# Patient Record
Sex: MALE | Race: WHITE | NOT HISPANIC OR LATINO | Employment: FULL TIME | URBAN - METROPOLITAN AREA
[De-identification: names, ages, dates, MRNs, and addresses within clinical notes are randomized per-mention and may not be internally consistent; named-entity substitution may affect disease eponyms.]

---

## 2017-02-08 ENCOUNTER — ALLSCRIPTS OFFICE VISIT (OUTPATIENT)
Dept: OTHER | Facility: OTHER | Age: 37
End: 2017-02-08

## 2017-02-08 LAB
BILIRUB UR QL STRIP: NORMAL
CLARITY UR: NORMAL
COLOR UR: YELLOW
GLUCOSE (HISTORICAL): NORMAL
HGB UR QL STRIP.AUTO: NORMAL
KETONES UR STRIP-MCNC: NORMAL MG/DL
LEUKOCYTE ESTERASE UR QL STRIP: NORMAL
NITRITE UR QL STRIP: NORMAL
PH UR STRIP.AUTO: 7.5 [PH]
PROT UR STRIP-MCNC: NORMAL MG/DL
SP GR UR STRIP.AUTO: 1.01
UROBILINOGEN UR QL STRIP.AUTO: NORMAL

## 2017-03-09 ENCOUNTER — ALLSCRIPTS OFFICE VISIT (OUTPATIENT)
Dept: OTHER | Facility: OTHER | Age: 37
End: 2017-03-09

## 2017-03-09 DIAGNOSIS — Z30.2 ENCOUNTER FOR STERILIZATION: ICD-10-CM

## 2017-03-09 PROCEDURE — 88302 TISSUE EXAM BY PATHOLOGIST: CPT | Performed by: UROLOGY

## 2017-03-10 ENCOUNTER — LAB REQUISITION (OUTPATIENT)
Dept: LAB | Facility: HOSPITAL | Age: 37
End: 2017-03-10
Payer: COMMERCIAL

## 2017-03-10 DIAGNOSIS — Z30.2 ENCOUNTER FOR STERILIZATION: ICD-10-CM

## 2017-03-24 ENCOUNTER — ALLSCRIPTS OFFICE VISIT (OUTPATIENT)
Dept: OTHER | Facility: OTHER | Age: 37
End: 2017-03-24

## 2017-04-27 DIAGNOSIS — Z30.09 ENCOUNTER FOR OTHER GENERAL COUNSELING AND ADVICE ON CONTRACEPTION: ICD-10-CM

## 2017-05-08 ENCOUNTER — APPOINTMENT (OUTPATIENT)
Dept: LAB | Facility: HOSPITAL | Age: 37
End: 2017-05-08
Payer: COMMERCIAL

## 2017-05-08 ENCOUNTER — TRANSCRIBE ORDERS (OUTPATIENT)
Dept: LAB | Facility: HOSPITAL | Age: 37
End: 2017-05-08

## 2017-05-08 DIAGNOSIS — Z30.09 ENCOUNTER FOR OTHER GENERAL COUNSELING AND ADVICE ON CONTRACEPTION: ICD-10-CM

## 2017-05-08 LAB
COMMENT POST VAS: NORMAL
PH SMN: 8.1 [PH] (ref 7.2–8.6)
POST  VAS COLLECTION: NORMAL
SPECIMEN VOL SMN: 2.3 ML (ref 1–5)
VISC SMN: 3 CP (ref 3–4)
WBC SMN QL: 0 "HPF"

## 2017-05-08 PROCEDURE — 89321 SEMEN ANAL SPERM DETECTION: CPT

## 2017-05-22 ENCOUNTER — APPOINTMENT (OUTPATIENT)
Dept: LAB | Facility: HOSPITAL | Age: 37
End: 2017-05-22
Payer: COMMERCIAL

## 2017-05-22 DIAGNOSIS — Z30.09 ENCOUNTER FOR OTHER GENERAL COUNSELING AND ADVICE ON CONTRACEPTION: ICD-10-CM

## 2017-05-22 LAB
COMMENT POST VAS: ABNORMAL
PH SMN: 8.3 [PH] (ref 7.2–8.6)
POST  VAS COLLECTION: ABNORMAL
SPECIMEN VOL SMN: 1.6 ML (ref 1–5)
VISC SMN: 3 CP (ref 3–4)
WBC SMN QL: 1 "HPF"

## 2017-05-22 PROCEDURE — 89321 SEMEN ANAL SPERM DETECTION: CPT

## 2017-06-13 ENCOUNTER — APPOINTMENT (OUTPATIENT)
Dept: LAB | Age: 37
End: 2017-06-13
Attending: PHYSICIAN ASSISTANT
Payer: COMMERCIAL

## 2017-06-13 ENCOUNTER — TRANSCRIBE ORDERS (OUTPATIENT)
Dept: URGENT CARE | Age: 37
End: 2017-06-13

## 2017-06-13 ENCOUNTER — OFFICE VISIT (OUTPATIENT)
Dept: URGENT CARE | Age: 37
End: 2017-06-13
Payer: COMMERCIAL

## 2017-06-13 DIAGNOSIS — R42 DIZZINESS: ICD-10-CM

## 2017-06-13 DIAGNOSIS — R42 DIZZINESS: Primary | ICD-10-CM

## 2017-06-13 LAB
ATRIAL RATE: 72 BPM
GLUCOSE SERPL-MCNC: 99 MG/DL (ref 65–140)
P AXIS: 44 DEGREES
PR INTERVAL: 160 MS
QRS AXIS: 4 DEGREES
QRSD INTERVAL: 88 MS
QT INTERVAL: 358 MS
QTC INTERVAL: 392 MS
T WAVE AXIS: 34 DEGREES
VENTRICULAR RATE: 72 BPM

## 2017-06-13 PROCEDURE — 93005 ELECTROCARDIOGRAM TRACING: CPT | Performed by: FAMILY MEDICINE

## 2017-06-13 PROCEDURE — 82948 REAGENT STRIP/BLOOD GLUCOSE: CPT

## 2017-06-13 PROCEDURE — S9083 URGENT CARE CENTER GLOBAL: HCPCS | Performed by: FAMILY MEDICINE

## 2017-06-13 PROCEDURE — 93005 ELECTROCARDIOGRAM TRACING: CPT

## 2017-06-13 PROCEDURE — G0383 LEV 4 HOSP TYPE B ED VISIT: HCPCS | Performed by: FAMILY MEDICINE

## 2017-08-04 ENCOUNTER — TRANSCRIBE ORDERS (OUTPATIENT)
Dept: URGENT CARE | Age: 37
End: 2017-08-04

## 2017-08-04 ENCOUNTER — OFFICE VISIT (OUTPATIENT)
Dept: URGENT CARE | Age: 37
End: 2017-08-04
Payer: COMMERCIAL

## 2017-08-04 ENCOUNTER — APPOINTMENT (OUTPATIENT)
Dept: RADIOLOGY | Age: 37
End: 2017-08-04
Attending: PHYSICIAN ASSISTANT
Payer: COMMERCIAL

## 2017-08-04 DIAGNOSIS — S99.919A INJURY OF ANKLE: ICD-10-CM

## 2017-08-04 PROCEDURE — 73610 X-RAY EXAM OF ANKLE: CPT

## 2017-08-04 PROCEDURE — 99213 OFFICE O/P EST LOW 20 MIN: CPT | Performed by: FAMILY MEDICINE

## 2018-01-11 ENCOUNTER — GENERIC CONVERSION - ENCOUNTER (OUTPATIENT)
Dept: BEHAVIORAL HEALTH UNIT | Facility: HOSPITAL | Age: 38
End: 2018-01-11

## 2018-01-11 ENCOUNTER — OFFICE VISIT (OUTPATIENT)
Dept: URGENT CARE | Age: 38
End: 2018-01-11
Payer: COMMERCIAL

## 2018-01-11 PROCEDURE — G0382 LEV 3 HOSP TYPE B ED VISIT: HCPCS | Performed by: FAMILY MEDICINE

## 2018-01-11 PROCEDURE — S9083 URGENT CARE CENTER GLOBAL: HCPCS | Performed by: FAMILY MEDICINE

## 2018-01-13 NOTE — PROGRESS NOTES
Assessment   1  Acute bronchitis (466 0) (J20 9)    Plan   Acute bronchitis    · Start: Azithromycin 250 MG Oral Tablet (Zithromax Z-Bhavin); TAKE 2 TABLETS ON DAY 1    THEN TAKE 1 TABLET A DAY FOR 4 DAYS   · Start: PredniSONE 10 MG Oral Tablet; TAKE 6 TABLETS TODAY, THEN DECREASE BY 1    TABLET EACH DAY UNTIL GONE   · Drink plenty of fluids ; Status:Complete;   Done: 83UPR3890   · Resume activity to your tolerance ; Status:Complete;   Done: 96GJW8209   · Use a cool mist humidifier in the room ; Status:Complete;   Done: 00NUM7846   · We suggest that you try a probiotic supplement ; Status:Complete;   Done: 93AYZ5717    Discussion/Summary   Discussion Summary:    Continue over-the-counter medications as needed for symptomatic care  Medication Side Effects Reviewed: Possible side effects of new medications were reviewed with the patient/guardian today  Understands and agrees with treatment plan: The treatment plan was reviewed with the patient/guardian  The patient/guardian understands and agrees with the treatment plan    Counseling Documentation With Imm: The patient was counseled regarding instructions for management,-- prognosis,-- patient and family education,-- impressions,-- risks and benefits of treatment options,-- importance of compliance with treatment  Follow Up Instructions: Follow Up with your Primary Care Provider in 3-4 days  If your symptoms worsen, go to the nearest [place] [address] Emergency Department  Chief Complaint   1  Cough  2  Ear Pain  3  Wheezing  Chief Complaint Free Text Note Form: A persistent cough, chest congestion, post nasal drip, right ear pain since 12/25/17  wheezing and asthma flare - up started Sunday  Patient stated he was seen at the PCP on 12/28/17 and they stated it was allergies  History of Present Illness   HPI: Patient has had a persistent cough and chest congestion and postnasal drip since 12/25/2017   The patient has been doing the over-the-counter medications as directed by his PCP but symptoms are progressing  On Sunday he started having more wheezing and chest congestion  Hospital Based Practices Required Assessment:      Pain Assessment      the patient states they have pain  (on a scale of 0 to 10, the patient rates the pain at 0 )      Abuse And Domestic Violence Screen       Yes, the patient is safe at home  -- The patient states no one is hurting them  Depression And Suicide Screen  No, the patient has not had thoughts of hurting themself  No, the patient has not felt depressed in the past 7 days  Prefered Language is  english  Cough: Chris Contreras presents with complaints of cough  Associated symptoms include wheezing,-- runny nose,-- stuffy nose-- and-- postnasal drainage, but-- no dyspnea,-- no chills,-- no fever,-- no sore throat,-- no myalgias,-- no pleuritic chest pain,-- no chest pain,-- no vomiting-- and-- no heartburn  Review of Systems   Focused-Male:      Constitutional: as noted in HPI       ENT: as noted in HPI  Respiratory: as noted in HPI  Active Problems   1  Ankle injury (959 7) (S99 919A)  2  Encounter for sterilization (V25 2) (Z30 2)  3  Encounter for vasectomy counseling (V25 09) (Z30 09)  4  Insect sting (989 5,E905 5) (T63 481A)  5  Left ankle pain (719 47) (M25 572)  6  Left sided chest pain (786 50) (R07 9)  7  Lightheadedness (780 4) (R42)  8  Otitis media of right ear (382 9) (H66 91)  9  Sinusitis (473 9) (J32 9)  10  Strain of shoulder, right (840 9) (S46 911A)  11  Testicular pain, right (608 9) (N50 811)  12  Upper back pain on left side (724 5) (M54 9)  13  Viral infection (079 99) (B34 9)    Past Medical History   1  History of Asthma (493 90) (J45 909)  2  Diarrhea (787 91) (R19 7)  Active Problems And Past Medical History Reviewed: The active problems and past medical history were reviewed and updated today  Family History   Mother   1   Family history of cardiac disorder (V17 49) (Z82 49)  Family History Reviewed: The family history was reviewed and updated today  Social History    · Being A Social Drinker   · Former smoker (T08 10) (N62 201)  Social History Reviewed: The social history was reviewed and updated today  Surgical History   1  Denied: History Of Prior Surgery  Surgical History Reviewed: The surgical history was reviewed and updated today  Current Meds   1  ProAir  (90 Base) MCG/ACT Inhalation Aerosol Solution; Therapy: (Recorded:11Jan2018) to Recorded  2  RUSTTE Allergy CAPS; Therapy: (Recorded:11Jan2018) to Recorded  Medication List Reviewed: The medication list was reviewed and updated today  Allergies   1  Penicillin V SOLR    Vitals   Signs   Recorded: 45SCR9290 06:02PM   Temperature: 97 6 F, Temporal  Heart Rate: 90  Respiration: 20  Systolic: 583  Diastolic: 59  Height: 5 ft 11 in  Weight: 285 lb   BMI Calculated: 39 75  BSA Calculated: 2 45  O2 Saturation: 96  Pain Scale: 0    Physical Exam        Constitutional      General appearance: No acute distress, well appearing and well nourished  Eyes      Conjunctiva and lids: No swelling, erythema, or discharge  Pupils and irises: Equal, round and reactive to light  Ears, Nose, Mouth, and Throat      External inspection of ears and nose: Normal        Otoscopic examination: Tympanic membrance translucent with normal light reflex  Canals patent without erythema  -- Bilateral nasal congestion with mild erythema  No discharge  Oropharynx: Normal with no erythema, edema, exudate or lesions  Pulmonary      Respiratory effort: No increased work of breathing or signs of respiratory distress  -- Coarse breath sounds bilateral upper airways clears with cough  No rales  No wheezes        Lymphatic      Palpation of lymph nodes in neck: Abnormal   bilateral anterior cervical node enlargement, but-- no posterior cervical node enlargement,-- no submandibular node enlargement-- and-- no supraclavicular node enlargement  Psychiatric      Orientation to person, place and time: Normal        Mood and affect: Normal        Message   Return to work or school:    Huma Esparza is under my professional care   He was seen in my office on 01/11/2018   Please excuse work 01/12/2018 due to illness            Signatures    Electronically signed by : Yadira Damon, [facility]; Jan 11 2018  6:56PM EST                       (Author)     Electronically signed by : Eleazar Lovett DO; Jan 12 2018  7:02AM EST                       (Co-author)

## 2018-01-13 NOTE — PROCEDURES
Assessment    1  Encounter for sterilization (V25 2) (Z30 2)    Plan  Encounter for sterilization    · Oxycodone-Acetaminophen 5-325 MG Oral Tablet; TAKE 1 TO 2 TABLETS EVERY 4  HOURS AS NEEDED FOR PAIN   Rx By: Yusuf Mcgregor; Dispense: 3 Days ; #:20 Tablet; Refill: 0; For: Encounter for sterilization; SUNI = N; Print Rx   · (1) TISSUE EXAM; Status:Active - Retrospective By Protocol Authorization; Requested  ZXI:[id number];    Perform:[facility]; KZI:[id number]; Last Updated By:Yamini Terrell; 3/9/2017 9:12:41 AM;Ordered;  For:Encounter for sterilization; Ordered By:Kristen Denis;  B : right vas deferns  B Date/Time: : 14FAX8280  B : Vas Deferens  A : left vas deferens  A Date/Time: : 90JBV8912  A : Vas Deferens  Impression: : z30 2   · Follow-up visit in 2 weeks Evaluation and Treatment  Follow-up  Status: Hold For -  Scheduling  Requested for: 59UNQ0036   Ordered; For: Encounter for sterilization; Ordered By: Yusuf Mcgregor Performed:  Due: 50MOZ1316    Chief Complaint  Chief Complaint Free Text Note Form: vas- signed consent in chart      History of Present Illness  HPI: 80-year-old male presents for vasectomy  He has no new complaints  Active Problems    1  Ankle injury (959 7) (S99 919A)   2  Encounter for sterilization (V25 2) (Z30 2)   3  Encounter for vasectomy counseling (V25 09) (Z30 09)   4  Left ankle pain (719 47) (M25 572)   5  Left sided chest pain (786 50) (R07 9)   6  Otitis media of right ear (382 9) (H66 91)   7  Strain of shoulder, right (840 9) (S46 911A)   8  Testicular pain, right (608 9) (N50 811)   9  Upper back pain on left side (724 5) (M54 9)   10  Viral infection (079 99) (B34 9)    Past Medical History    1  History of Asthma (493 90) (J45 909)   2  Diarrhea (787 91) (R19 7)    Surgical History    1  Denied: History Of Prior Surgery    Family History  Mother    1   Family history of cardiac disorder (V17 49) (Z80 55)    Social History    · Being A Social Drinker   · Former smoker (V15 82) (A86 822)    Current Meds   1  Albuterol 90 MCG/ACT AERS; Therapy: (Recorded:18Apr2016) to Recorded   2  Claritin 10 MG Oral Tablet; Therapy: (Recorded:00Fih2562) to Recorded   3  LORazepam 2 MG Oral Tablet; TAKE 1 TABLET Once 1 hr prior to procedure; Therapy: 28LJY1635 to (Evaluate:09Feb2017); Last Rx:40Rbt6331 Ordered   4  Sulfamethoxazole-Trimethoprim 800-160 MG Oral Tablet; TAKE 1 TABLET Once on   morning of procedure; Therapy: 00MLI3611 to (Evaluate:09Feb2017)  Requested for: 68GCM8538; Last   Rx:08Feb2017 Ordered    Allergies    1  Penicillin V SOLR    Vitals  Vital Signs    Recorded: 91BOM2139 09:17AM   Heart Rate 76   Systolic 675   Diastolic 82   Height 5 ft 11 in   Weight 277 lb 6 oz   BMI Calculated 38 69   BSA Calculated 2 42     Physical Exam    Constitutional   General appearance: No acute distress, well appearing and well nourished  Pulmonary   Respiratory effort: No increased work of breathing or signs of respiratory distress  Cardiovascular   Examination of extremities for edema and/or varicosities: Normal     Abdomen   Abdomen: Non-tender, no masses  Liver and spleen: No hepatomegaly or splenomegaly  Genitourinary   Scrotum contents: Normal size, no masses  Epididymis: Normal, no masses  Testes: Normal testes, no masses  Urethral meatus: Normal, no lesions  Penis: Normal, no lesions  Musculoskeletal   Gait and station: Normal     Skin   Skin and subcutaneous tissue: Normal without rashes or lesions  Lymphatic   Palpation of lymph nodes in groin: Normal     Additional Exam:  Neuro exam nonfocal       Procedure    Procedure: Vasectomy   Risks, benefits and alternatives were discussed with the patient  We discussed possible complications, including infection, bleeding and allergic reaction  Written consent was obtained prior to the procedure  The patient was premedicated with lorazepam 2 mg  He was placed on the table in the supine position   He was prepped and draped in a sterile fashion  The scrotum was anesthetized with 10 ml of lidocaine 2%  The sheath was anesthetized with 2 ml of lidocaine 2%  The skin was opened with the sharp clamp and the right vas was grasped with the ring clamp  The perivasal sheath and vessels were then dissected off bluntly and the vas was doubly clamped  A portion was excised and both ends were then fulgurated and tied with 0 silk  Seeing good hemostasis, they were returned to the scrotum and the skin was closed with a chromic stich  The skin was opened with the sharp clamp and the left vas was grasped with the ring clamp  The perivasal sheath and vessels were then dissected off bluntly and the vas was doubly clamped  A portion was excised and both ends were then fulgurated and tied with 0 silk  Seeing good hemostasis, they were returned to the scrotum and the skin was closed with a chromic stich  an antibiotic ointment was applied and a sterile dressing was placed  the patient tolerated the procedure well  there were no complications  He was given a prescription for Keflex and Vicodin postprocedure  He understands he is not to be considered sterile until 2 consecutive negative semen analyses are obtained postprocedure  He also understands he should rest, ice and elevate the scrotum for atleast 48 hours to avoid complication such as hematoma         Signatures   Electronically signed by : AILYN Lundberg ; Mar  9 2017 10:17AM EST                       (Author)

## 2018-01-13 NOTE — MISCELLANEOUS
Message  Return to work or school:   Rhea Moya is under my professional care   He was seen in my office on 03/09/2017   He is able to return to work on  03/13/2017            Signatures   Electronically signed by : AILYN Bland ; Mar 11 2017 10:40AM EST

## 2018-01-14 VITALS
DIASTOLIC BLOOD PRESSURE: 86 MMHG | SYSTOLIC BLOOD PRESSURE: 130 MMHG | WEIGHT: 268.38 LBS | BODY MASS INDEX: 37.57 KG/M2 | HEART RATE: 72 BPM | HEIGHT: 71 IN

## 2018-01-14 VITALS
DIASTOLIC BLOOD PRESSURE: 84 MMHG | BODY MASS INDEX: 38.5 KG/M2 | HEART RATE: 76 BPM | SYSTOLIC BLOOD PRESSURE: 128 MMHG | HEIGHT: 71 IN | WEIGHT: 275 LBS

## 2018-01-18 NOTE — MISCELLANEOUS
Message  Return to work or school:    He is able to return to work on  10/24/2016            Signatures   Electronically signed by : Carter Renee DO; Oct 24 2016  9:23AM EST                       (Author)

## 2018-01-22 VITALS
WEIGHT: 277.38 LBS | BODY MASS INDEX: 38.83 KG/M2 | SYSTOLIC BLOOD PRESSURE: 130 MMHG | HEIGHT: 71 IN | HEART RATE: 76 BPM | DIASTOLIC BLOOD PRESSURE: 82 MMHG

## 2018-01-23 VITALS
SYSTOLIC BLOOD PRESSURE: 126 MMHG | OXYGEN SATURATION: 96 % | HEART RATE: 90 BPM | BODY MASS INDEX: 39.9 KG/M2 | HEIGHT: 71 IN | RESPIRATION RATE: 20 BRPM | DIASTOLIC BLOOD PRESSURE: 59 MMHG | TEMPERATURE: 97.6 F | WEIGHT: 285 LBS

## 2018-02-28 NOTE — MISCELLANEOUS
Message  Return to work or school:   Queenie Guerrier is under my professional care   He was seen in my office on 01/11/2018   Please excuse work 01/12/2018 due to illness           Signatures   Electronically signed by : Jayesh Gaspar [facility]; Jan 11 2018  6:56PM EST                       (Author)    Electronically signed by : Jayesh Gaspar [facility]; Jan 11 2018  7:24PM EST

## 2018-03-08 ENCOUNTER — OFFICE VISIT (OUTPATIENT)
Dept: URGENT CARE | Age: 38
End: 2018-03-08
Payer: COMMERCIAL

## 2018-03-08 VITALS
RESPIRATION RATE: 20 BRPM | TEMPERATURE: 97.4 F | SYSTOLIC BLOOD PRESSURE: 143 MMHG | DIASTOLIC BLOOD PRESSURE: 68 MMHG | BODY MASS INDEX: 39.28 KG/M2 | WEIGHT: 290 LBS | HEART RATE: 89 BPM | OXYGEN SATURATION: 96 % | HEIGHT: 72 IN

## 2018-03-08 DIAGNOSIS — M54.9 UPPER BACK PAIN ON LEFT SIDE: Primary | ICD-10-CM

## 2018-03-08 DIAGNOSIS — R20.0 LEFT ARM NUMBNESS: ICD-10-CM

## 2018-03-08 PROCEDURE — S9083 URGENT CARE CENTER GLOBAL: HCPCS | Performed by: FAMILY MEDICINE

## 2018-03-08 PROCEDURE — G0382 LEV 3 HOSP TYPE B ED VISIT: HCPCS | Performed by: FAMILY MEDICINE

## 2018-03-08 RX ORDER — NAPROXEN 500 MG/1
500 TABLET ORAL 2 TIMES DAILY WITH MEALS
Qty: 30 TABLET | Refills: 0 | Status: SHIPPED | OUTPATIENT
Start: 2018-03-08 | End: 2019-05-01 | Stop reason: ALTCHOICE

## 2018-03-08 RX ORDER — CYCLOBENZAPRINE HCL 10 MG
10 TABLET ORAL
Qty: 20 TABLET | Refills: 0 | Status: SHIPPED | OUTPATIENT
Start: 2018-03-08 | End: 2019-05-01 | Stop reason: ALTCHOICE

## 2018-03-08 NOTE — LETTER
March 8, 2018     Patient: Anastasia Tello   YOB: 1980   Date of Visit: 3/8/2018       To Whom It May Concern: It is my medical opinion that Kraig Owen may return to work on 03/12/2018  If you have any questions or concerns, please don't hesitate to call           Sincerely,        Crystal Carson DO    CC: No Recipients

## 2018-03-08 NOTE — PROGRESS NOTES
[facility]        NAME: Bob Dalton is a 45 y o  male  : 1980    MRN: [id number]  DATE: 2018  TIME: 8:55 AM    Assessment and Plan   Upper back pain on left side [M54 9]  1  Upper back pain on left side  naproxen (EC NAPROSYN) 500 MG EC tablet    cyclobenzaprine (FLEXERIL) 10 mg tablet   2  Left arm numbness           Patient Instructions     Patient Instructions   Rest, limit activity  Naprosyn twice a day for pain and inflammation (please take with food)  No ibuprofen (Advil/Motrin) while taking Naprosyn  Flexeril (cyclobenzaprine) twice a day or just at bedtime for muscle tightness (may cause sedation)  Use moist heat and ice as discussed  Recheck/follow-up with family physician for further care (physical therapy and possible imaging - MRI)    Please go to the hospital emergency department if worse  Chief Complaint     Chief Complaint   Patient presents with    Arm Pain     left arm pain shoulder down arm,  since tuesday AM, with numbness from elbow down,  since AM  denies any further complaints at this time  History of Present Illness       Patient with left upper back pain and numbness the left arm since Tuesday (2018); no injury; patient is right-hand dominant; no chest discomfort or trouble breathing        Review of Systems   Review of Systems   Constitutional: Negative  HENT: Negative  Respiratory: Negative  Cardiovascular: Negative  Musculoskeletal:        Tenderness of left upper back musculature   Skin: Negative  Neurological: Positive for numbness          Numbness of left arm         Current Medications       Current Outpatient Prescriptions:     cyclobenzaprine (FLEXERIL) 10 mg tablet, Take 1 tablet (10 mg total) by mouth daily at bedtime for 20 doses, Disp: 20 tablet, Rfl: 0    naproxen (EC NAPROSYN) 500 MG EC tablet, Take 1 tablet (500 mg total) by mouth 2 (two) times a day with meals for 30 doses, Disp: 30 tablet, Rfl: 0    Current Allergies     Allergies as of 03/08/2018 - Reviewed 03/08/2018   Allergen Reaction Noted    Penicillins  02/08/2014            The following portions of the patient's history were reviewed and updated as appropriate: allergies, current medications, past family history, past medical history, past social history, past surgical history and problem list      Past Medical History:   Diagnosis Date    Asthma        History reviewed  No pertinent surgical history  No family history on file  Medications have been verified  Objective   /68   Pulse 89   Temp (!) 97 4 °F (36 3 °C) (Temporal)   Resp 20   Ht 6' (1 829 m)   Wt 132 kg (290 lb)   SpO2 96%   BMI 39 33 kg/m²        Physical Exam     Physical Exam   Constitutional: He is oriented to person, place, and time  He appears well-developed and well-nourished  HENT:   Mouth/Throat: Oropharynx is clear and moist    Neck: Normal range of motion  Neck supple  Cardiovascular: Normal rate and regular rhythm  Pulmonary/Chest: Effort normal and breath sounds normal    Musculoskeletal:   Tenderness and tightness of left upper back musculature   Neurological: He is alert and oriented to person, place, and time  Skin: Skin is warm  Good color and turgor   Psychiatric: He has a normal mood and affect   His behavior is normal

## 2018-03-08 NOTE — PATIENT INSTRUCTIONS
Rest, limit activity  Naprosyn twice a day for pain and inflammation (please take with food)  No ibuprofen (Advil/Motrin) while taking Naprosyn  Flexeril (cyclobenzaprine) twice a day or just at bedtime for muscle tightness (may cause sedation)  Use moist heat and ice as discussed  Recheck/follow-up with family physician for further care (physical therapy and possible imaging - MRI)    Please go to the hospital emergency department if worse

## 2019-02-04 ENCOUNTER — OFFICE VISIT (OUTPATIENT)
Dept: URGENT CARE | Age: 39
End: 2019-02-04
Payer: COMMERCIAL

## 2019-02-04 VITALS
WEIGHT: 285 LBS | TEMPERATURE: 98 F | OXYGEN SATURATION: 98 % | BODY MASS INDEX: 38.6 KG/M2 | HEART RATE: 84 BPM | SYSTOLIC BLOOD PRESSURE: 122 MMHG | HEIGHT: 72 IN | RESPIRATION RATE: 18 BRPM | DIASTOLIC BLOOD PRESSURE: 66 MMHG

## 2019-02-04 DIAGNOSIS — H57.89 EYE IRRITATION: Primary | ICD-10-CM

## 2019-02-04 PROCEDURE — 99213 OFFICE O/P EST LOW 20 MIN: CPT | Performed by: FAMILY MEDICINE

## 2019-02-04 NOTE — LETTER
February 4, 2019     Patient: Sharyn Beauchamp   YOB: 1980   Date of Visit: 2/4/2019       To Whom It May Concern: It is my medical opinion that Santiago Willingham should remain out of work until 02/06/2019  If you have any questions or concerns, please don't hesitate to call           Sincerely,        [facility]    CC: No Recipients

## 2019-02-04 NOTE — PATIENT INSTRUCTIONS
There is no abnormality on the eye exam that I can do here  I would recommend he follow up with her eye doctor tomorrow as you have eye appointment scheduled  For the next 24 hr I would continue to use lubricating eyedrops every 4 hr as needed  She could try some cold or warm compresses    If eye becomes worse, he had change in vision, redness of her eye I would like to the ER for more thorough eye exam

## 2019-02-04 NOTE — PROGRESS NOTES
[facility]        NAME: Sharyn Beauchamp is a 45 y o  male  : 1980    MRN: [id number]  DATE: 2019  TIME: 5:29 PM    Assessment and Plan   Eye irritation [H57 89]  1  Eye irritation           Patient Instructions     Patient Instructions   There is no abnormality on the eye exam that I can do here  I would recommend he follow up with her eye doctor tomorrow as you have eye appointment scheduled  For the next 24 hr I would continue to use lubricating eyedrops every 4 hr as needed  She could try some cold or warm compresses  If eye becomes worse, he had change in vision, redness of her eye I would like to the ER for more thorough eye exam       Chief Complaint     Chief Complaint   Patient presents with    Eye Problem     bilateral eye irritation  History of Present Illness   Sharyn Beauchamp presents to the clinic c/o    This is a 19-year-old male here today with complaints of bilateral eye irritation  He states symptoms started about 2 or 3 days ago  He does were contacts  He states 2-3 days ago this started as dryness  He removed his contacts was wearing his glasses  He was using some lubricating drops in his eyes  He denies any itching there is no redness or drainage  He does have some eye pain but denies any change in vision  No fevers bodies or chills  He states he tried again with eye doctor today but was unable to do so  He has appointment to be seen by a doctor tomorrow  Review of Systems   Review of Systems   Constitutional: Negative  HENT: Negative  Eyes: Positive for pain  Negative for photophobia, discharge, redness, itching and visual disturbance  Respiratory: Negative  Neurological: Negative  Psychiatric/Behavioral: Negative            Current Medications     Long-Term Prescriptions   Medication Sig Dispense Refill    cyclobenzaprine (FLEXERIL) 10 mg tablet Take 1 tablet (10 mg total) by mouth daily at bedtime for 20 doses 20 tablet 0    naproxen (EC NAPROSYN) 500 MG EC tablet Take 1 tablet (500 mg total) by mouth 2 (two) times a day with meals for 30 doses 30 tablet 0       Current Allergies     Allergies as of 02/04/2019 - Reviewed 02/04/2019   Allergen Reaction Noted    Penicillins  02/08/2014            The following portions of the patient's history were reviewed and updated as appropriate: allergies, current medications, past family history, past medical history, past social history, past surgical history and problem list     Objective   /66 (BP Location: Left arm, Patient Position: Sitting)   Pulse 84   Temp 98 °F (36 7 °C) (Temporal)   Resp 18   Ht 6' (1 829 m)   Wt 129 kg (285 lb)   SpO2 98%   BMI 38 65 kg/m²        Physical Exam     Physical Exam   Constitutional: He is oriented to person, place, and time  He appears well-developed and well-nourished  Eyes: No foreign body present in the right eye  No foreign body present in the left eye  Right conjunctiva is not injected  Left conjunctiva is not injected  Right pupil is round and reactive  Left pupil is round and reactive  Neck: Normal range of motion  Neck supple  Pulmonary/Chest: Effort normal    Neurological: He is alert and oriented to person, place, and time  Psychiatric: He has a normal mood and affect  His behavior is normal    Nursing note and vitals reviewed

## 2019-05-01 ENCOUNTER — APPOINTMENT (OUTPATIENT)
Dept: RADIOLOGY | Age: 39
End: 2019-05-01
Payer: COMMERCIAL

## 2019-05-01 ENCOUNTER — OFFICE VISIT (OUTPATIENT)
Dept: URGENT CARE | Age: 39
End: 2019-05-01
Payer: COMMERCIAL

## 2019-05-01 VITALS
WEIGHT: 280 LBS | DIASTOLIC BLOOD PRESSURE: 83 MMHG | HEART RATE: 89 BPM | TEMPERATURE: 97 F | BODY MASS INDEX: 37.93 KG/M2 | RESPIRATION RATE: 18 BRPM | OXYGEN SATURATION: 100 % | SYSTOLIC BLOOD PRESSURE: 135 MMHG | HEIGHT: 72 IN

## 2019-05-01 DIAGNOSIS — R07.81 RIB PAIN ON LEFT SIDE: ICD-10-CM

## 2019-05-01 DIAGNOSIS — S23.41XA RIB SPRAIN, INITIAL ENCOUNTER: ICD-10-CM

## 2019-05-01 DIAGNOSIS — S22.32XA CLOSED FRACTURE OF ONE RIB OF LEFT SIDE, INITIAL ENCOUNTER: Primary | ICD-10-CM

## 2019-05-01 PROCEDURE — 93005 ELECTROCARDIOGRAM TRACING: CPT | Performed by: NURSE PRACTITIONER

## 2019-05-01 PROCEDURE — 99213 OFFICE O/P EST LOW 20 MIN: CPT | Performed by: FAMILY MEDICINE

## 2019-05-01 PROCEDURE — 71101 X-RAY EXAM UNILAT RIBS/CHEST: CPT

## 2019-05-01 RX ORDER — CETIRIZINE HYDROCHLORIDE 10 MG/1
10 TABLET ORAL DAILY
COMMUNITY

## 2019-05-01 RX ORDER — NAPROXEN 500 MG/1
500 TABLET ORAL 2 TIMES DAILY WITH MEALS
Qty: 14 TABLET | Refills: 0 | Status: SHIPPED | OUTPATIENT
Start: 2019-05-01 | End: 2021-08-25 | Stop reason: ALTCHOICE

## 2019-05-02 ENCOUNTER — APPOINTMENT (EMERGENCY)
Dept: CT IMAGING | Facility: HOSPITAL | Age: 39
End: 2019-05-02
Payer: COMMERCIAL

## 2019-05-02 ENCOUNTER — HOSPITAL ENCOUNTER (EMERGENCY)
Facility: HOSPITAL | Age: 39
Discharge: HOME | End: 2019-05-02
Attending: EMERGENCY MEDICINE | Admitting: EMERGENCY MEDICINE
Payer: COMMERCIAL

## 2019-05-02 VITALS
OXYGEN SATURATION: 96 % | TEMPERATURE: 99.1 F | HEART RATE: 103 BPM | RESPIRATION RATE: 20 BRPM | DIASTOLIC BLOOD PRESSURE: 91 MMHG | BODY MASS INDEX: 37.97 KG/M2 | WEIGHT: 280 LBS | SYSTOLIC BLOOD PRESSURE: 144 MMHG

## 2019-05-02 DIAGNOSIS — R07.81 RIB PAIN ON LEFT SIDE: Primary | ICD-10-CM

## 2019-05-02 DIAGNOSIS — S20.212A RIB CONTUSION, LEFT, INITIAL ENCOUNTER: ICD-10-CM

## 2019-05-02 LAB
ATRIAL RATE: 81 BPM
P AXIS: 52 DEGREES
PR INTERVAL: 168 MS
QRS AXIS: 4 DEGREES
QRSD INTERVAL: 92 MS
QT INTERVAL: 356 MS
QTC INTERVAL: 413 MS
T WAVE AXIS: 41 DEGREES
VENTRICULAR RATE: 81 BPM

## 2019-05-02 PROCEDURE — 93010 ELECTROCARDIOGRAM REPORT: CPT | Performed by: INTERNAL MEDICINE

## 2019-05-02 PROCEDURE — 99284 EMERGENCY DEPT VISIT MOD MDM: CPT | Performed by: EMERGENCY MEDICINE

## 2019-05-02 PROCEDURE — 99284 EMERGENCY DEPT VISIT MOD MDM: CPT

## 2019-05-02 PROCEDURE — 93005 ELECTROCARDIOGRAM TRACING: CPT

## 2019-05-02 PROCEDURE — 71250 CT THORAX DX C-: CPT

## 2019-05-02 RX ORDER — OXYCODONE HYDROCHLORIDE AND ACETAMINOPHEN 5; 325 MG/1; MG/1
1 TABLET ORAL EVERY 6 HOURS PRN
Qty: 12 EACH | Refills: 0 | Status: SHIPPED | OUTPATIENT
Start: 2019-05-02 | End: 2019-05-12

## 2019-05-02 RX ORDER — LIDOCAINE 50 MG/G
1 PATCH TOPICAL ONCE
Status: DISCONTINUED | OUTPATIENT
Start: 2019-05-02 | End: 2019-05-02 | Stop reason: HOSPADM

## 2019-05-02 RX ORDER — LIDOCAINE 50 MG/G
1 PATCH TOPICAL DAILY
Qty: 30 PATCH | Refills: 0 | Status: SHIPPED | OUTPATIENT
Start: 2019-05-02 | End: 2021-08-25 | Stop reason: HOSPADM

## 2019-05-02 RX ORDER — IBUPROFEN 600 MG/1
600 TABLET ORAL ONCE
Status: COMPLETED | OUTPATIENT
Start: 2019-05-02 | End: 2019-05-02

## 2019-05-02 RX ADMIN — IBUPROFEN 600 MG: 600 TABLET ORAL at 16:23

## 2019-05-02 RX ADMIN — LIDOCAINE 1 PATCH: 50 PATCH TOPICAL at 16:23

## 2019-05-03 LAB
ATRIAL RATE: 97 BPM
P AXIS: 55 DEGREES
PR INTERVAL: 184 MS
QRS AXIS: 16 DEGREES
QRSD INTERVAL: 90 MS
QT INTERVAL: 316 MS
QTC INTERVAL: 401 MS
T WAVE AXIS: 51 DEGREES
VENTRICULAR RATE: 97 BPM

## 2019-05-03 PROCEDURE — 93010 ELECTROCARDIOGRAM REPORT: CPT | Performed by: INTERNAL MEDICINE

## 2019-05-21 ENCOUNTER — TRANSCRIBE ORDERS (OUTPATIENT)
Dept: ADMINISTRATIVE | Facility: HOSPITAL | Age: 39
End: 2019-05-21

## 2019-05-21 ENCOUNTER — TRANSCRIBE ORDERS (OUTPATIENT)
Dept: ADMINISTRATIVE | Age: 39
End: 2019-05-21

## 2019-05-21 ENCOUNTER — APPOINTMENT (OUTPATIENT)
Dept: LAB | Age: 39
End: 2019-05-21
Payer: COMMERCIAL

## 2019-05-21 DIAGNOSIS — R19.7 DIARRHEA OF PRESUMED INFECTIOUS ORIGIN: ICD-10-CM

## 2019-05-21 DIAGNOSIS — R10.10 UPPER ABDOMINAL PAIN: Primary | ICD-10-CM

## 2019-05-21 DIAGNOSIS — R10.10 UPPER ABDOMINAL PAIN: ICD-10-CM

## 2019-05-21 LAB
ALBUMIN SERPL BCP-MCNC: 3.8 G/DL (ref 3.5–5)
ALP SERPL-CCNC: 97 U/L (ref 46–116)
ALT SERPL W P-5'-P-CCNC: 54 U/L (ref 12–78)
ANION GAP SERPL CALCULATED.3IONS-SCNC: 4 MMOL/L (ref 4–13)
AST SERPL W P-5'-P-CCNC: 29 U/L (ref 5–45)
BASOPHILS # BLD AUTO: 0.05 THOUSANDS/ΜL (ref 0–0.1)
BASOPHILS NFR BLD AUTO: 1 % (ref 0–1)
BILIRUB SERPL-MCNC: 0.3 MG/DL (ref 0.2–1)
BUN SERPL-MCNC: 10 MG/DL (ref 5–25)
CALCIUM SERPL-MCNC: 8.5 MG/DL (ref 8.3–10.1)
CHLORIDE SERPL-SCNC: 107 MMOL/L (ref 100–108)
CO2 SERPL-SCNC: 27 MMOL/L (ref 21–32)
CREAT SERPL-MCNC: 0.88 MG/DL (ref 0.6–1.3)
EOSINOPHIL # BLD AUTO: 0.35 THOUSAND/ΜL (ref 0–0.61)
EOSINOPHIL NFR BLD AUTO: 5 % (ref 0–6)
ERYTHROCYTE [DISTWIDTH] IN BLOOD BY AUTOMATED COUNT: 13.3 % (ref 11.6–15.1)
GFR SERPL CREATININE-BSD FRML MDRD: 108 ML/MIN/1.73SQ M
GLUCOSE SERPL-MCNC: 89 MG/DL (ref 65–140)
HCT VFR BLD AUTO: 42.8 % (ref 36.5–49.3)
HGB BLD-MCNC: 14.3 G/DL (ref 12–17)
IMM GRANULOCYTES # BLD AUTO: 0.03 THOUSAND/UL (ref 0–0.2)
IMM GRANULOCYTES NFR BLD AUTO: 0 % (ref 0–2)
LIPASE SERPL-CCNC: 144 U/L (ref 73–393)
LYMPHOCYTES # BLD AUTO: 2.22 THOUSANDS/ΜL (ref 0.6–4.47)
LYMPHOCYTES NFR BLD AUTO: 29 % (ref 14–44)
MCH RBC QN AUTO: 28.1 PG (ref 26.8–34.3)
MCHC RBC AUTO-ENTMCNC: 33.4 G/DL (ref 31.4–37.4)
MCV RBC AUTO: 84 FL (ref 82–98)
MONOCYTES # BLD AUTO: 0.63 THOUSAND/ΜL (ref 0.17–1.22)
MONOCYTES NFR BLD AUTO: 8 % (ref 4–12)
NEUTROPHILS # BLD AUTO: 4.44 THOUSANDS/ΜL (ref 1.85–7.62)
NEUTS SEG NFR BLD AUTO: 57 % (ref 43–75)
NRBC BLD AUTO-RTO: 0 /100 WBCS
PLATELET # BLD AUTO: 295 THOUSANDS/UL (ref 149–390)
PMV BLD AUTO: 10.5 FL (ref 8.9–12.7)
POTASSIUM SERPL-SCNC: 4 MMOL/L (ref 3.5–5.3)
PROT SERPL-MCNC: 7.5 G/DL (ref 6.4–8.2)
RBC # BLD AUTO: 5.08 MILLION/UL (ref 3.88–5.62)
SODIUM SERPL-SCNC: 138 MMOL/L (ref 136–145)
WBC # BLD AUTO: 7.72 THOUSAND/UL (ref 4.31–10.16)

## 2019-05-21 PROCEDURE — 83690 ASSAY OF LIPASE: CPT

## 2019-05-21 PROCEDURE — 80053 COMPREHEN METABOLIC PANEL: CPT

## 2019-05-21 PROCEDURE — 87505 NFCT AGENT DETECTION GI: CPT

## 2019-05-21 PROCEDURE — 36415 COLL VENOUS BLD VENIPUNCTURE: CPT

## 2019-05-21 PROCEDURE — 85025 COMPLETE CBC W/AUTO DIFF WBC: CPT

## 2019-05-22 LAB
CAMPYLOBACTER DNA SPEC NAA+PROBE: NORMAL
SALMONELLA DNA SPEC QL NAA+PROBE: NORMAL
SHIGA TOXIN STX GENE SPEC NAA+PROBE: NORMAL
SHIGELLA DNA SPEC QL NAA+PROBE: NORMAL

## 2019-05-28 ENCOUNTER — HOSPITAL ENCOUNTER (OUTPATIENT)
Dept: RADIOLOGY | Age: 39
Discharge: HOME | End: 2019-05-28
Payer: COMMERCIAL

## 2019-05-28 DIAGNOSIS — R10.10 UPPER ABDOMINAL PAIN: ICD-10-CM

## 2019-05-28 PROCEDURE — 74170 CT ABD WO CNTRST FLWD CNTRST: CPT

## 2019-05-28 RX ADMIN — IOHEXOL 100 ML: 350 INJECTION, SOLUTION INTRAVENOUS at 13:38

## 2019-11-02 ENCOUNTER — APPOINTMENT (OUTPATIENT)
Dept: LAB | Age: 39
End: 2019-11-02
Payer: COMMERCIAL

## 2019-11-02 ENCOUNTER — APPOINTMENT (OUTPATIENT)
Dept: URGENT CARE | Age: 39
End: 2019-11-02

## 2019-11-02 ENCOUNTER — TRANSCRIBE ORDERS (OUTPATIENT)
Dept: ADMINISTRATIVE | Facility: HOSPITAL | Age: 39
End: 2019-11-02

## 2019-11-02 DIAGNOSIS — Z13.9 SCREENING FOR UNSPECIFIED CONDITION: Primary | ICD-10-CM

## 2019-11-02 DIAGNOSIS — Z13.6 SCREENING FOR ISCHEMIC HEART DISEASE: ICD-10-CM

## 2019-11-02 DIAGNOSIS — Z13.9 SCREENING FOR UNSPECIFIED CONDITION: ICD-10-CM

## 2019-11-02 LAB
CHOLEST SERPL-MCNC: 208 MG/DL (ref 50–200)
GLUCOSE P FAST SERPL-MCNC: 94 MG/DL (ref 65–99)
HDLC SERPL-MCNC: 27 MG/DL
LDLC SERPL CALC-MCNC: 105 MG/DL (ref 0–100)
NONHDLC SERPL-MCNC: 181 MG/DL
TRIGL SERPL-MCNC: 380 MG/DL

## 2019-11-02 PROCEDURE — 82947 ASSAY GLUCOSE BLOOD QUANT: CPT

## 2019-11-02 PROCEDURE — 80323 ALKALOIDS NOS: CPT

## 2019-11-02 PROCEDURE — 80061 LIPID PANEL: CPT

## 2019-11-02 PROCEDURE — 36415 COLL VENOUS BLD VENIPUNCTURE: CPT

## 2019-11-08 LAB
COTININE SERPL-MCNC: NORMAL NG/ML
NICOTINE SERPL-MCNC: NORMAL NG/ML

## 2020-10-14 ENCOUNTER — OFFICE VISIT (OUTPATIENT)
Dept: URGENT CARE | Age: 40
End: 2020-10-14
Payer: COMMERCIAL

## 2020-10-14 VITALS
RESPIRATION RATE: 18 BRPM | SYSTOLIC BLOOD PRESSURE: 110 MMHG | HEART RATE: 136 BPM | TEMPERATURE: 97 F | DIASTOLIC BLOOD PRESSURE: 70 MMHG | OXYGEN SATURATION: 97 %

## 2020-10-14 DIAGNOSIS — R50.9 FEVER, UNSPECIFIED FEVER CAUSE: Primary | ICD-10-CM

## 2020-10-14 PROCEDURE — S9083 URGENT CARE CENTER GLOBAL: HCPCS | Performed by: NURSE PRACTITIONER

## 2020-10-14 PROCEDURE — U0003 INFECTIOUS AGENT DETECTION BY NUCLEIC ACID (DNA OR RNA); SEVERE ACUTE RESPIRATORY SYNDROME CORONAVIRUS 2 (SARS-COV-2) (CORONAVIRUS DISEASE [COVID-19]), AMPLIFIED PROBE TECHNIQUE, MAKING USE OF HIGH THROUGHPUT TECHNOLOGIES AS DESCRIBED BY CMS-2020-01-R: HCPCS | Performed by: NURSE PRACTITIONER

## 2020-10-14 PROCEDURE — G0382 LEV 3 HOSP TYPE B ED VISIT: HCPCS | Performed by: NURSE PRACTITIONER

## 2020-10-16 LAB — SARS-COV-2 RNA SPEC QL NAA+PROBE: NOT DETECTED

## 2020-11-07 ENCOUNTER — APPOINTMENT (OUTPATIENT)
Dept: LAB | Age: 40
End: 2020-11-07
Payer: COMMERCIAL

## 2020-11-07 ENCOUNTER — TRANSCRIBE ORDERS (OUTPATIENT)
Dept: ADMINISTRATIVE | Age: 40
End: 2020-11-07

## 2020-11-07 DIAGNOSIS — R03.0 ELEVATED BLOOD PRESSURE READING WITHOUT DIAGNOSIS OF HYPERTENSION: ICD-10-CM

## 2020-11-07 DIAGNOSIS — E66.09 EXOGENOUS OBESITY: ICD-10-CM

## 2020-11-07 DIAGNOSIS — R89.9 ABNORMAL PLEURAL FLUID: ICD-10-CM

## 2020-11-07 DIAGNOSIS — E78.5 HYPERLIPIDEMIA, UNSPECIFIED HYPERLIPIDEMIA TYPE: ICD-10-CM

## 2020-11-07 DIAGNOSIS — G47.33 OBSTRUCTIVE SLEEP APNEA (ADULT) (PEDIATRIC): ICD-10-CM

## 2020-11-07 DIAGNOSIS — E66.09 EXOGENOUS OBESITY: Primary | ICD-10-CM

## 2020-11-07 LAB
25(OH)D3 SERPL-MCNC: 21.3 NG/ML (ref 30–100)
ALBUMIN SERPL BCP-MCNC: 3.6 G/DL (ref 3.5–5)
ALP SERPL-CCNC: 92 U/L (ref 46–116)
ALT SERPL W P-5'-P-CCNC: 49 U/L (ref 12–78)
ANION GAP SERPL CALCULATED.3IONS-SCNC: 3 MMOL/L (ref 4–13)
AST SERPL W P-5'-P-CCNC: 21 U/L (ref 5–45)
BILIRUB SERPL-MCNC: 0.5 MG/DL (ref 0.2–1)
BUN SERPL-MCNC: 10 MG/DL (ref 5–25)
CALCIUM SERPL-MCNC: 9.1 MG/DL (ref 8.3–10.1)
CHLORIDE SERPL-SCNC: 109 MMOL/L (ref 100–108)
CHOLEST SERPL-MCNC: 189 MG/DL (ref 50–200)
CO2 SERPL-SCNC: 29 MMOL/L (ref 21–32)
CREAT SERPL-MCNC: 0.78 MG/DL (ref 0.6–1.3)
CREAT UR-MCNC: 206 MG/DL
GFR SERPL CREATININE-BSD FRML MDRD: 113 ML/MIN/1.73SQ M
GLUCOSE P FAST SERPL-MCNC: 100 MG/DL (ref 65–99)
HDLC SERPL-MCNC: 34 MG/DL
LDLC SERPL CALC-MCNC: 83 MG/DL (ref 0–100)
NONHDLC SERPL-MCNC: 155 MG/DL
POTASSIUM SERPL-SCNC: 4 MMOL/L (ref 3.5–5.3)
PROT SERPL-MCNC: 6.9 G/DL (ref 6.4–8.2)
PROT UR-MCNC: 20 MG/DL
PROT/CREAT UR: 0.1 MG/G{CREAT} (ref 0–0.1)
SODIUM SERPL-SCNC: 141 MMOL/L (ref 136–145)
TRIGL SERPL-MCNC: 360 MG/DL
TSH SERPL DL<=0.05 MIU/L-ACNC: 1.27 UIU/ML (ref 0.36–3.74)

## 2020-11-07 PROCEDURE — 36415 COLL VENOUS BLD VENIPUNCTURE: CPT

## 2020-11-07 PROCEDURE — 80053 COMPREHEN METABOLIC PANEL: CPT

## 2020-11-07 PROCEDURE — 82570 ASSAY OF URINE CREATININE: CPT | Performed by: FAMILY MEDICINE

## 2020-11-07 PROCEDURE — 80061 LIPID PANEL: CPT

## 2020-11-07 PROCEDURE — 82306 VITAMIN D 25 HYDROXY: CPT

## 2020-11-07 PROCEDURE — 84156 ASSAY OF PROTEIN URINE: CPT | Performed by: FAMILY MEDICINE

## 2020-11-07 PROCEDURE — 84443 ASSAY THYROID STIM HORMONE: CPT

## 2021-03-10 DIAGNOSIS — Z23 ENCOUNTER FOR IMMUNIZATION: ICD-10-CM

## 2021-03-11 ENCOUNTER — IMMUNIZATIONS (OUTPATIENT)
Dept: FAMILY MEDICINE CLINIC | Facility: HOSPITAL | Age: 41
End: 2021-03-11

## 2021-03-11 DIAGNOSIS — Z23 ENCOUNTER FOR IMMUNIZATION: Primary | ICD-10-CM

## 2021-03-11 PROCEDURE — 0001A SARS-COV-2 / COVID-19 MRNA VACCINE (PFIZER-BIONTECH) 30 MCG: CPT

## 2021-03-11 PROCEDURE — 91300 SARS-COV-2 / COVID-19 MRNA VACCINE (PFIZER-BIONTECH) 30 MCG: CPT

## 2021-04-01 ENCOUNTER — IMMUNIZATIONS (OUTPATIENT)
Dept: FAMILY MEDICINE CLINIC | Facility: HOSPITAL | Age: 41
End: 2021-04-01

## 2021-04-01 DIAGNOSIS — Z23 ENCOUNTER FOR IMMUNIZATION: Primary | ICD-10-CM

## 2021-04-01 PROCEDURE — 0002A SARS-COV-2 / COVID-19 MRNA VACCINE (PFIZER-BIONTECH) 30 MCG: CPT

## 2021-04-01 PROCEDURE — 91300 SARS-COV-2 / COVID-19 MRNA VACCINE (PFIZER-BIONTECH) 30 MCG: CPT

## 2021-05-07 ENCOUNTER — HOSPITAL ENCOUNTER (EMERGENCY)
Facility: HOSPITAL | Age: 41
Discharge: HOME | End: 2021-05-07
Attending: EMERGENCY MEDICINE | Admitting: EMERGENCY MEDICINE
Payer: COMMERCIAL

## 2021-05-07 VITALS
DIASTOLIC BLOOD PRESSURE: 82 MMHG | OXYGEN SATURATION: 95 % | SYSTOLIC BLOOD PRESSURE: 142 MMHG | RESPIRATION RATE: 17 BRPM | TEMPERATURE: 98.4 F | HEART RATE: 86 BPM

## 2021-05-07 DIAGNOSIS — R07.9 CHEST PAIN, UNSPECIFIED TYPE: ICD-10-CM

## 2021-05-07 DIAGNOSIS — R07.9 CHEST PAIN, UNSPECIFIED: Primary | ICD-10-CM

## 2021-05-07 LAB
ALBUMIN SERPL BCP-MCNC: 3.8 G/DL (ref 3.5–5)
ALP SERPL-CCNC: 105 U/L (ref 46–116)
ALT SERPL W P-5'-P-CCNC: 54 U/L (ref 12–78)
ANION GAP SERPL CALCULATED.3IONS-SCNC: 8 MMOL/L (ref 4–13)
AST SERPL W P-5'-P-CCNC: 24 U/L (ref 5–45)
ATRIAL RATE: 87 BPM
BASOPHILS # BLD AUTO: 0.05 THOUSANDS/ΜL (ref 0–0.1)
BASOPHILS NFR BLD AUTO: 1 % (ref 0–1)
BILIRUB SERPL-MCNC: 0.44 MG/DL (ref 0.2–1)
BUN SERPL-MCNC: 9 MG/DL (ref 5–25)
CALCIUM SERPL-MCNC: 8.8 MG/DL (ref 8.3–10.1)
CHLORIDE SERPL-SCNC: 105 MMOL/L (ref 100–108)
CO2 SERPL-SCNC: 27 MMOL/L (ref 21–32)
CREAT SERPL-MCNC: 0.92 MG/DL (ref 0.6–1.3)
D DIMER PPP FEU-MCNC: 0.37 UG/ML FEU
EOSINOPHIL # BLD AUTO: 0.13 THOUSAND/ΜL (ref 0–0.61)
EOSINOPHIL NFR BLD AUTO: 1 % (ref 0–6)
ERYTHROCYTE [DISTWIDTH] IN BLOOD BY AUTOMATED COUNT: 13.3 % (ref 11.6–15.1)
GFR SERPL CREATININE-BSD FRML MDRD: 103 ML/MIN/1.73SQ M
GLUCOSE SERPL-MCNC: 100 MG/DL (ref 65–140)
HCT VFR BLD AUTO: 47.4 % (ref 36.5–49.3)
HGB BLD-MCNC: 15.9 G/DL (ref 12–17)
IMM GRANULOCYTES # BLD AUTO: 0.07 THOUSAND/UL (ref 0–0.2)
IMM GRANULOCYTES NFR BLD AUTO: 1 % (ref 0–2)
LYMPHOCYTES # BLD AUTO: 1.91 THOUSANDS/ΜL (ref 0.6–4.47)
LYMPHOCYTES NFR BLD AUTO: 19 % (ref 14–44)
MCH RBC QN AUTO: 27.8 PG (ref 26.8–34.3)
MCHC RBC AUTO-ENTMCNC: 33.5 G/DL (ref 31.4–37.4)
MCV RBC AUTO: 83 FL (ref 82–98)
MONOCYTES # BLD AUTO: 0.91 THOUSAND/ΜL (ref 0.17–1.22)
MONOCYTES NFR BLD AUTO: 9 % (ref 4–12)
NEUTROPHILS # BLD AUTO: 7.26 THOUSANDS/ΜL (ref 1.85–7.62)
NEUTS SEG NFR BLD AUTO: 69 % (ref 43–75)
NRBC BLD AUTO-RTO: 0 /100 WBCS
P AXIS: 46 DEGREES
PLATELET # BLD AUTO: 321 THOUSANDS/UL (ref 149–390)
PMV BLD AUTO: 9.3 FL (ref 8.9–12.7)
POTASSIUM SERPL-SCNC: 4 MMOL/L (ref 3.5–5.3)
PR INTERVAL: 166 MS
PROT SERPL-MCNC: 7.8 G/DL (ref 6.4–8.2)
QRS AXIS: -7 DEGREES
QRSD INTERVAL: 86 MS
QT INTERVAL: 334 MS
QTC INTERVAL: 401 MS
RBC # BLD AUTO: 5.71 MILLION/UL (ref 3.88–5.62)
SODIUM SERPL-SCNC: 140 MMOL/L (ref 136–145)
T WAVE AXIS: 52 DEGREES
TROPONIN I SERPL-MCNC: <0.02 NG/ML
VENTRICULAR RATE: 87 BPM
WBC # BLD AUTO: 10.33 THOUSAND/UL (ref 4.31–10.16)

## 2021-05-07 PROCEDURE — 84484 ASSAY OF TROPONIN QUANT: CPT | Performed by: EMERGENCY MEDICINE

## 2021-05-07 PROCEDURE — 80053 COMPREHEN METABOLIC PANEL: CPT | Performed by: EMERGENCY MEDICINE

## 2021-05-07 PROCEDURE — 93005 ELECTROCARDIOGRAM TRACING: CPT

## 2021-05-07 PROCEDURE — 93010 ELECTROCARDIOGRAM REPORT: CPT | Performed by: INTERNAL MEDICINE

## 2021-05-07 PROCEDURE — 99285 EMERGENCY DEPT VISIT HI MDM: CPT | Performed by: EMERGENCY MEDICINE

## 2021-05-07 PROCEDURE — 99285 EMERGENCY DEPT VISIT HI MDM: CPT

## 2021-05-07 PROCEDURE — 36415 COLL VENOUS BLD VENIPUNCTURE: CPT

## 2021-05-07 PROCEDURE — C9113 INJ PANTOPRAZOLE SODIUM, VIA: HCPCS | Performed by: STUDENT IN AN ORGANIZED HEALTH CARE EDUCATION/TRAINING PROGRAM

## 2021-05-07 PROCEDURE — 85025 COMPLETE CBC W/AUTO DIFF WBC: CPT | Performed by: EMERGENCY MEDICINE

## 2021-05-07 PROCEDURE — 85379 FIBRIN DEGRADATION QUANT: CPT | Performed by: STUDENT IN AN ORGANIZED HEALTH CARE EDUCATION/TRAINING PROGRAM

## 2021-05-07 PROCEDURE — 96374 THER/PROPH/DIAG INJ IV PUSH: CPT

## 2021-05-07 RX ORDER — PANTOPRAZOLE SODIUM 40 MG/1
40 INJECTION, POWDER, FOR SOLUTION INTRAVENOUS ONCE
Status: COMPLETED | OUTPATIENT
Start: 2021-05-07 | End: 2021-05-07

## 2021-05-07 RX ORDER — OMEPRAZOLE 20 MG/1
20 CAPSULE, DELAYED RELEASE ORAL DAILY
Qty: 30 CAPSULE | Refills: 0 | Status: SHIPPED | OUTPATIENT
Start: 2021-05-07 | End: 2021-08-25 | Stop reason: HOSPADM

## 2021-05-07 RX ADMIN — PANTOPRAZOLE SODIUM 40 MG: 40 INJECTION, POWDER, FOR SOLUTION INTRAVENOUS at 12:51

## 2021-05-07 NOTE — ED PROVIDER NOTES
History  Chief Complaint   Patient presents with    Chest Pain     pt started with chest tightness this morning, worseing when taking a deep breath     12-year-old male past medical history of asthma and carpal tunnel syndrome  Has been taking meloxicam for carpal tunnel for the past month and a half  Presents to the ED with chest pain 3/10 oppressive in nature associated with inspiration that began at 8:00 a m  And lasted until 11:00 a m  Denies fever denies shortness of breath denies chest pain  Refers right lower extremity calf pain yesterday  No history of DVT  Prior to Admission Medications   Prescriptions Last Dose Informant Patient Reported? Taking? cetirizine (ZyrTEC) 10 mg tablet  Self Yes No   Sig: Take 10 mg by mouth daily   lidocaine (LIDODERM) 5 %   No No   Sig: Apply 1 patch topically daily Remove & Discard patch within 12 hours or as directed by MD   naproxen (NAPROSYN) 500 mg tablet   No No   Sig: Take 1 tablet (500 mg total) by mouth 2 (two) times a day with meals for 7 days      Facility-Administered Medications: None       Past Medical History:   Diagnosis Date    Asthma        History reviewed  No pertinent surgical history  History reviewed  No pertinent family history  I have reviewed and agree with the history as documented  E-Cigarette/Vaping     E-Cigarette/Vaping Substances     Social History     Tobacco Use    Smoking status: Former Smoker    Smokeless tobacco: Never Used   Substance Use Topics    Alcohol use: Yes     Comment: rare    Drug use: No        Review of Systems   Constitutional: Negative  HENT: Negative  Eyes: Negative  Respiratory: Negative  Negative for cough, choking, chest tightness and shortness of breath  Cardiovascular: Negative  Gastrointestinal: Negative  Endocrine: Negative  Genitourinary: Negative  Musculoskeletal: Negative  Allergic/Immunologic: Negative  Neurological: Negative  Hematological: Negative  Psychiatric/Behavioral: Negative  Physical Exam  ED Triage Vitals [05/07/21 1149]   Temperature Pulse Respirations Blood Pressure SpO2   98 4 °F (36 9 °C) 105 18 140/86 97 %      Temp Source Heart Rate Source Patient Position - Orthostatic VS BP Location FiO2 (%)   Oral Monitor -- -- --      Pain Score       --             Orthostatic Vital Signs  Vitals:    05/07/21 1149 05/07/21 1300   BP: 140/86 142/82   Pulse: 105 86       Physical Exam  Constitutional:       Appearance: He is well-developed  He is obese  HENT:      Head: Normocephalic  Neck:      Musculoskeletal: Normal range of motion  Cardiovascular:      Rate and Rhythm: Normal rate and regular rhythm  Heart sounds: Normal heart sounds  Pulmonary:      Effort: Pulmonary effort is normal       Breath sounds: Normal breath sounds  Chest:      Chest wall: No tenderness  Abdominal:      General: Bowel sounds are normal       Palpations: Abdomen is soft  There is no mass  Tenderness: There is no abdominal tenderness  There is no guarding or rebound  Musculoskeletal: Normal range of motion  Right lower leg: No edema  Left lower leg: No edema  Skin:     General: Skin is warm  Capillary Refill: Capillary refill takes less than 2 seconds  Neurological:      General: No focal deficit present  Mental Status: He is alert and oriented to person, place, and time     Psychiatric:         Mood and Affect: Mood normal          Behavior: Behavior normal          ED Medications  Medications   pantoprazole (PROTONIX) injection 40 mg (40 mg Intravenous Given 5/7/21 1251)       Diagnostic Studies  Results Reviewed     Procedure Component Value Units Date/Time    D-dimer, quantitative [[id number]]  (Normal) Collected: 05/07/21 1159    Lab Status: Final result Specimen: Blood from Arm, Left Updated: 05/07/21 1247     D-Dimer, Quant 0 37 ug/ml FEU     Troponin I [[id number]]  (Normal) Collected: 05/07/21 1159    Lab Status: Final result Specimen: Blood from Arm, Left Updated: 05/07/21 1237     Troponin I <0 02 ng/mL     Comprehensive metabolic panel [[id number]] Collected: 05/07/21 1159    Lab Status: Final result Specimen: Blood from Arm, Left Updated: 05/07/21 1232     Sodium 140 mmol/L      Potassium 4 0 mmol/L      Chloride 105 mmol/L      CO2 27 mmol/L      ANION GAP 8 mmol/L      BUN 9 mg/dL      Creatinine 0 92 mg/dL      Glucose 100 mg/dL      Calcium 8 8 mg/dL      AST 24 U/L      ALT 54 U/L      Alkaline Phosphatase 105 U/L      Total Protein 7 8 g/dL      Albumin 3 8 g/dL      Total Bilirubin 0 44 mg/dL      eGFR 103 ml/min/1 73sq m     Narrative:      [place] guidelines for Chronic Kidney Disease (CKD):     Stage 1 with normal or high GFR (GFR > 90 mL/min/1 73 square meters)    Stage 2 Mild CKD (GFR = 60-89 mL/min/1 73 square meters)    Stage 3A Moderate CKD (GFR = 45-59 mL/min/1 73 square meters)    Stage 3B Moderate CKD (GFR = 30-44 mL/min/1 73 square meters)    Stage 4 Severe CKD (GFR = 15-29 mL/min/1 73 square meters)    Stage 5 End Stage CKD (GFR <15 mL/min/1 73 square meters)  Note: GFR calculation is accurate only with a steady state creatinine    CBC and differential [[id number]]  (Abnormal) Collected: 05/07/21 1159    Lab Status: Final result Specimen: Blood from Arm, Left Updated: 05/07/21 1216     WBC 10 33 Thousand/uL      RBC 5 71 Million/uL      Hemoglobin 15 9 g/dL      Hematocrit 47 4 %      MCV 83 fL      MCH 27 8 pg      MCHC 33 5 g/dL      RDW 13 3 %      MPV 9 3 fL      Platelets 882 Thousands/uL      nRBC 0 /100 WBCs      Neutrophils Relative 69 %      Immat GRANS % 1 %      Lymphocytes Relative 19 %      Monocytes Relative 9 %      Eosinophils Relative 1 %      Basophils Relative 1 %      Neutrophils Absolute 7 26 Thousands/µL      Immature Grans Absolute 0 07 Thousand/uL      Lymphocytes Absolute 1 91 Thousands/µL      Monocytes Absolute 0 91 Thousand/µL      Eosinophils Absolute 0 13 Thousand/µL      Basophils Absolute 0 05 Thousands/µL                  No orders to display         Procedures  ECG 12 Lead Documentation Only    Date/Time: 5/7/2021 12:31 PM  Performed by: Army Edwin MD  Authorized by: Army Edwin MD     Patient location:  ED  Previous ECG:     Previous ECG:  Compared to current    Similarity:  No change    Comparison to cardiac monitor: Yes    Interpretation:     Interpretation: normal    Rate:     ECG rate assessment: normal    Rhythm:     Rhythm: sinus rhythm    Ectopy:     Ectopy: none    QRS:     QRS axis:  Normal  Conduction:     Conduction: normal    ST segments:     ST segments:  Normal  T waves:     T waves: normal            ED Course      EKG normal   D-dimer normal troponin normal   Will discharge patient on pantoprazole   follow-up PCP      HEART Risk Score      Most Recent Value   Heart Score Risk Calculator   History  0 Filed at: 05/07/2021 1320   ECG  0 Filed at: 05/07/2021 1320   Age  0 Filed at: 05/07/2021 1320   Risk Factors  1 Filed at: 05/07/2021 1320   Troponin  0 Filed at: 05/07/2021 1320   HEART Score  1 Filed at: 05/07/2021 1320              PERC Rule for PE      Most Recent Value   PERC Rule for PE   Age >=50  0 Filed at: 05/07/2021 1222   HR >=100  1 Filed at: 05/07/2021 1222   O2 Sat on room air < 95%  0 Filed at: 05/07/2021 1222   History of PE or DVT  0 Filed at: 05/07/2021 1222   Recent trauma or surgery  0 Filed at: 05/07/2021 1222   Hemoptysis  0 Filed at: 05/07/2021 1222   Exogenous estrogen  0 Filed at: 05/07/2021 1222   Unilateral leg swelling  0 Filed at: 05/07/2021 1222   PERC Rule for PE Results  1 Filed at: 05/07/2021 1222                  Wells' Criteria for PE      Most Recent Value   Wells' Criteria for PE   Clinical signs and symptoms of DVT  0 Filed at: 05/07/2021 1222   PE is primary diagnosis or equally likely  0 Filed at: 05/07/2021 1222   HR >100  1 5 Filed at: 05/07/2021 1222   Immobilization at least 3 days or Surgery in the previous 4 weeks  0 Filed at: 05/07/2021 1222   Previous, objectively diagnosed PE or DVT  0 Filed at: 05/07/2021 1222   Hemoptysis  0 Filed at: 05/07/2021 1222   Malignancy with treatment within 6 months or palliative  0 Filed at: 05/07/2021 1222   Wells' Criteria Total  1 5 Filed at: 05/07/2021 1222            [facility]  Number of Diagnoses or Management Options  Diagnosis management comments: 26-year-old male past medical history of asthma presents with chest pain possibly pleuritic type  No shortness of breath cough chest pain  Will proceed with MI rule out and D-dimer for PE rule out  Has been taking meloxicam for past month and a half will give 1 dose of pantoprazole as well      Disposition  Final diagnoses:   Chest pain, unspecified   Chest pain, unspecified type     Time reflects when diagnosis was documented in both MDM as applicable and the Disposition within this note     Time User Action Codes Description Comment    5/7/2021  1:21 PM Marella Flores A Add [R07 9] Chest pain, unspecified     5/10/2021  2:17 PM Marella Flores A Add [R07 9] Chest pain, unspecified type       ED Disposition     ED Disposition Condition Date/Time Comment    Discharge Stable Fri May 7, 2021  1:21 PM Alycia Schafer discharge to home/self care              Follow-up Information    None         Discharge Medication List as of 5/7/2021  1:22 PM      START taking these medications    Details   omeprazole (PriLOSEC) 20 mg delayed release capsule Take 1 capsule (20 mg total) by mouth daily, Starting Fri 5/7/2021, Until Sun 6/6/2021, Normal         CONTINUE these medications which have NOT CHANGED    Details   cetirizine (ZyrTEC) 10 mg tablet Take 10 mg by mouth daily, Historical Med      lidocaine (LIDODERM) 5 % Apply 1 patch topically daily Remove & Discard patch within 12 hours or as directed by MD, Starting Thu 5/2/2019, Normal      naproxen (NAPROSYN) 500 mg tablet Take 1 tablet (500 mg total) by mouth 2 (two) times a day with meals for 7 days, Starting Wed 5/1/2019, Until Wed 5/8/2019, Normal           No discharge procedures on file  PDMP Review     None           ED Provider  Attending physically available and evaluated Frann Aase I managed the patient along with the ED Attending      Electronically Signed by         Tavares Roach MD  05/07/21 2140 Venus Dominic Chairez MD  05/10/21 1512

## 2021-05-07 NOTE — Clinical Note
Kraig Owen was seen and treated in our emergency department on 5/7/2021  Diagnosis:     Valentin Vela  may return to work on return date  He may return on this date: 05/10/2021         If you have any questions or concerns, please don't hesitate to call        Kali Abad RN    ______________________________           _______________          _______________  Hospital Representative                              Date                                Time

## 2021-05-07 NOTE — Clinical Note
Christian Aquino was seen and treated in our emergency department on 5/7/2021  Diagnosis:     Umesh Devlin  may return to work on return date  He may return on this date: 05/10/2021         If you have any questions or concerns, please don't hesitate to call        Jose Crews RN    ______________________________           _______________          _______________  Hospital Representative                              Date                                Time

## 2021-05-10 NOTE — ED ATTENDING ATTESTATION
5/7/2021  IGiovana MD, saw and evaluated the patient  I have discussed the patient with the resident/non-physician practitioner and agree with the resident's/non-physician practitioner's findings, Plan of Care, and MDM as documented in the resident's/non-physician practitioner's note, except where noted  All available labs and Radiology studies were reviewed  I was present for key portions of any procedure(s) performed by the resident/non-physician practitioner and I was immediately available to provide assistance  At this point I agree with the current assessment done in the Emergency Department  I have conducted an independent evaluation of this patient a history and physical is as follows:    60-year-old male presents to the emergency department for evaluation with chest pain  Central discomfort/tightness started around 08:00 while was seated and working on his computer  This was most prominent upon taking a deep breath with maximal discomfort of the 3/10  He is unable to identify provoking modifying factors  Discomfort has since resolved  He did not have any associated symptoms such is palpitations, dyspnea, lightheadedness, diaphoresis or paresthesias  He did not appreciate any abdominal discomfort for reflux  He has generally felt well recently  He was on meloxicam for approximately a month and a half treating wrist discomfort/carpal tunnel pain  He did notice some slight cramping in the right calf yesterday  This has resolved  He did not appreciate any swelling along with that  Medical history otherwise significant for asthma  No known personal history of hypertension, diabetes, elevated cholesterol or coagulopathy  He is a nonsmoker and does not family history of premature CAD  On exam patient is well-appearing in no acute distress  Mucous membranes are moist   Heart sounds are regular and lungs are clear to auscultation bilaterally    There is no chest wall or abdominal tenderness  Lower extremities nontender non edematous with strong PT pulses  EKG and lab work is unremarkable  He was at low risk for PE   D-dimer pursued given very mild tachycardia presentation and pleuritic nature of pain  This was unremarkable  Troponin not elevated  He is at low risk for ACS  Given recent NSAID use have some suspicion for GERD as etiology of discomfort  Dose of PPI given  Patient to follow-up with PCP    He is aware to return p r n  worsening/new concerns    ED Course         Critical Care Time  Procedures

## 2021-08-17 ENCOUNTER — TELEPHONE (OUTPATIENT)
Dept: PODIATRY | Facility: CLINIC | Age: 41
End: 2021-08-17

## 2021-08-25 ENCOUNTER — OFFICE VISIT (OUTPATIENT)
Dept: OBGYN CLINIC | Facility: HOSPITAL | Age: 41
End: 2021-08-25
Payer: COMMERCIAL

## 2021-08-25 VITALS
DIASTOLIC BLOOD PRESSURE: 88 MMHG | WEIGHT: 306.8 LBS | HEIGHT: 72 IN | SYSTOLIC BLOOD PRESSURE: 132 MMHG | HEART RATE: 103 BPM | BODY MASS INDEX: 41.55 KG/M2

## 2021-08-25 DIAGNOSIS — G56.01 CARPAL TUNNEL SYNDROME ON RIGHT: ICD-10-CM

## 2021-08-25 DIAGNOSIS — G56.02 CARPAL TUNNEL SYNDROME ON LEFT: Primary | ICD-10-CM

## 2021-08-25 PROCEDURE — 1036F TOBACCO NON-USER: CPT | Performed by: ORTHOPAEDIC SURGERY

## 2021-08-25 PROCEDURE — 3008F BODY MASS INDEX DOCD: CPT | Performed by: ORTHOPAEDIC SURGERY

## 2021-08-25 PROCEDURE — 99204 OFFICE O/P NEW MOD 45 MIN: CPT | Performed by: ORTHOPAEDIC SURGERY

## 2021-08-25 NOTE — PROGRESS NOTES
ASSESSMENT/PLAN:    Assessment:   Left Carpal Tunnel Syndrome  Right Carpal Tunnel Syndrome    Plan:   Schedule for R Endoscopic Carpal Tunnel Release and then for Left Endoscopic Carpal Tunnel release 2 weeks afterwards    Follow Up: After Surgery    To Do Next Visit:  Sutures out    Operative Discussions:     Endoscopic Carpal Tunnel Release: The anatomy and physiology of carpal tunnel syndrome was discussed with the patient today  Increase pressure localized under the transverse carpal ligament can cause pain, numbness, tingling, or dysesthesias within the median nerve distribution as well as feelings of fatigue, clumsiness, or awkwardness  These symptoms typically occur at night and worse in the morning upon waking  Eventually, untreated carpal tunnel syndrome can result in weakness and permanent loss of muscle within the thenar compartment of the hand  Treatment options were discussed with the patient  Conservative treatment includes nocturnal resting splints to keep the nerve in a neutral position, ergonomic changes within the work or home environment, activity modification, and tendon gliding exercises  Steroid injections within the carpal canal can help a majority of patients, however this is often self-limited in a majority of patients  Surgical intervention to divide the transverse carpal ligament typically results in a long-lasting relief of the patient's complaints, with the recurrence rate of less than 1%  The patient has elected to undergo an endoscopic carpal tunnel release  The 2 incision technique was discussed with the patient, which results in approximately a two-week less recovery time, and less wound complications  In the postoperative period, light activities are allowed immediately, driving is allowed when narcotic medication has stopped, and the bandages may be removed and incision may get wet after 2 days    Heavy activities (lifting more than approximately 10 pounds) will be allowed after follow up appointment in 1-2 weeks  While the pain and discomfort in the hands generally improves rapidly, the numbness and tingling as well as the strength will slowly improve over weeks to months depending on the severity of the carpal tunnel syndrome  Pillar pain was discussed with the patient, which is typically a common but self-limiting condition  The risks of bleeding and infection from the surgery are less than 1%  Risk of recurrence is approximately 0 5%  The risks of nerve injury or nerve damage or damage to the blood vessels is approximately 1 in 1200  The patient has an understanding of the above mentioned discussion  The risks and benefits of the procedure were explained to the patient, which include, but are not limited to: Bleeding, infection, recurrence, pain, scar, damage to tendons, damage to nerves, and damage to blood vessels, failure to give desired results and complications related to anesthesia   These risks, along with alternative conservative treatment options, and postoperative protocols were voiced back and understood by the patient   All questions were answered to the patient's satisfaction   The patient agrees to comply with a standard postoperative protocol, and is willing to proceed   Education was provided via written and auditory forms   There were no barriers to learning  Written handouts regarding wound care, incision and scar care, and general preoperative information was provided to the patient   Prior to surgery, the patient may be requested to stop all anti-inflammatory medications   Prophylactic aspirin, Plavix, and Coumadin may be allowed to be continued   Medications including vitamin E , ginkgo, and fish oil are requested to be stopped approximately one week prior to surgery   Hypertensive medications and beta blockers, if taken, should be continued      _____________________________________________________  CHIEF COMPLAINT:  Chief Complaint   Patient presents with    Left Wrist - Numbness, Tingling    Right Wrist - Numbness, Tingling         SUBJECTIVE:  Deborah Tyson is a 39 y o  male who presents with Numbness to the bilateral hand, index finger, long finger and thumb  This started >6 month(s) ago as Insidious  Radiation: Yes to the  index finger, long finger and thumb  Previous Treatments: steroid injections, NSAIDs, activity modification and bracing without relief  Associated symptoms: Numbness  Handedness: left  Work status: works in a computer    PAST MEDICAL HISTORY:  Past Medical History:   Diagnosis Date    Asthma        PAST SURGICAL HISTORY:  No past surgical history on file  FAMILY HISTORY:  No family history on file  SOCIAL HISTORY:  Social History     Tobacco Use    Smoking status: Former Smoker    Smokeless tobacco: Never Used   Substance Use Topics    Alcohol use: Yes     Comment: rare    Drug use: No       MEDICATIONS:    Current Outpatient Medications:     cetirizine (ZyrTEC) 10 mg tablet, Take 10 mg by mouth daily, Disp: , Rfl:     lidocaine (LIDODERM) 5 %, Apply 1 patch topically daily Remove & Discard patch within 12 hours or as directed by MD, Disp: 30 patch, Rfl: 0    naproxen (NAPROSYN) 500 mg tablet, Take 1 tablet (500 mg total) by mouth 2 (two) times a day with meals for 7 days, Disp: 14 tablet, Rfl: 0    omeprazole (PriLOSEC) 20 mg delayed release capsule, Take 1 capsule (20 mg total) by mouth daily, Disp: 30 capsule, Rfl: 0    ALLERGIES:  Allergies   Allergen Reactions    Penicillins      Unknown, childhood allergy       REVIEW OF SYSTEMS:  Pertinent items are noted in HPI  A comprehensive review of systems was negative      LABS:  HgA1c: No results found for: HGBA1C  BMP:   Lab Results   Component Value Date    GLUCOSE 102 08/18/2015    CALCIUM 8 8 05/07/2021     08/18/2015    K 4 0 05/07/2021    CO2 27 05/07/2021     05/07/2021    BUN 9 05/07/2021    CREATININE 0 92 05/07/2021 _____________________________________________________  PHYSICAL EXAMINATION:  Vital signs: There were no vitals taken for this visit  General: well developed and well nourished, alert, oriented times 3 and appears comfortable  Psychiatric: Normal  HEENT: Trachea Midline, No torticollis  Cardiovascular: No discernable arrhythmia  Pulmonary: No wheezing or stridor  Abdomen: No rebound or guarding  Extremities: No peripheral edema  Skin: No masses, erythema, lacerations, fluctation, ulcerations  Neurovascular: Sensation Intact to the Median, Ulnar, Radial Nerve, Motor Intact to the Median, Ulnar, Radial Nerve and Pulses Intact    MUSCULOSKELETAL EXAMINATION:  LEFT SIDE:  Carpal tunnel:  No atrophy thenar muscles, Postive Tinel's, Positive Derkin's Compression Test and APB 4+/5  RIGHT SIDE: Carpal tunnel:  No atrophy thenar muscles, Postive Tinel's, Positive Derkin's Compression Test and APB 4+/5    _____________________________________________________  STUDIES REVIEWED:  EMG limited result obtained through Care Everywhere showed Bilateral Carpal Tunnel Syndrome      PROCEDURES PERFORMED:  Procedures         I interviewed, took the history and examined the patient  I discuss the case with the resident and reviewed the resident's note  I supervised the resident and I agree with the resident management plan as it was presented to me  I was present in the clinic and examined the patient

## 2021-08-25 NOTE — PATIENT INSTRUCTIONS
[facility]  Lisa Mcknight MD  Chief of [address] [place] [address]  [phone]  You have chosen to undergo surgery for your condition  Any surgery is associated with risks of potential complications  Certain medical problems such as smoking, diabetes, thyroid disease, neuropathy, malnutrition or bleeding problems may increase your risk of complications  Complications after surgery are rare but include the following:   Bleeding Infection   Scar Pain   Tenderness Problems with healing   Damage to nerves Damage to blood vessels   Lack of desired results Need for further surgery   Numbness Stiffness   Problems with anesthesia Blood clots   Need for hardware removal (if inserted)    If bony work is done, other risks include:   Delayed bone healing   Lack of bone healing   Bones healing in wrong position    While these risks and complications are rare and infrequent they are still important to know and discuss  If you have any other questions, please let me know  _____________________________________________________________________________________  It can be difficult, but it is possible to get on with your life with only one hand for the first few weeks after your operation  The following are a few suggestions that may be helpful when you're recovering from your hand problem or from your hand surgery  While most of these suggestions are really only applicable if your dominant or your writing hand is affected, some apply to problems involving either hand  Most daily activities can be accomplished with some modifications, rather than the need for store bought devices  Before surgery, if you can:   Ask for help: Have others help you with: childcare, housework, and meals   Practice: Dressing, undressing, using the toilet, brushing your teeth, showering   Prepare: for the first few days after surgery    o Open and re-sealed cans and bottles that you may need   o Open medication containers and leave them easy-to-read open  Make sure you put these medication containers out of reach of children, even if you don't expect children to visit you   o Prepare and think about no-cutmeals-sandwiches, ground meats, etc     It helps to have   In the shower  o Plastic bags and rubber bands to cover bandages-the najera that newspapers come in are good  Also, small trashcan liners will work  Use 2 of these at a time  The other option is an oversized rubber glove that may be purchased at a food store to aid in dishwashing   o A bottle sponge (soft sponge on a long stick)-for the armpit of yourgood hand  o Shower brush  o At [place] in the shower will help you to wash your hair  o A cotton terrycloth bathrobe to aid you in drying your back     In the bathroom  o Toothpaste, shampoo, etc  in a flip top or pump dispenser  o Flossers (dental floss on aYshaped handle)  o Consider an electric razor     In the bedroom  o Back scratcher  o Large sleeve shirts and tops  o Put away clothing which buttons, fastens or snaps in the back, or which uses drawstrings     In the kitchen  o A rubber jar opener Aleksander-to help open jars, but also to keep things from sliding around while you are working on them   o Double suction cup pads (the little octopus)-to hold items while you use or wash them  o An electric can opener with a lid magnet strong enough to hold the can in the air-for one-handed use   Consider Kira Forte and wear haircut  [employer] [facility]   You should keep your bandages dry and clean; change your dressings if you see drainage coming through your dressings   Signs of infection include increased pain, swelling, redness, warmth, and excessive or foul-smelling drainage  Please contact our office if you experience signs of infection   You may wash with soap and water after given permission     Sutures will be removed between 7-14 days after surgery if the wound is healed  Patients who smoke, have diabetes, or have nutritional problems may need longer to heal    Steri-strips may be placed across your incision at this time, which will fall off or peel away   To help prevent infection, do not submerse your incision area for 2-3 weeks (i e  no hot tubs, pools, ocean, dish water, fish ponds, fish tanks, bathtubs)   Swelling, bruising, and numbness are common after surgery  To help reduce these symptoms, keep the area elevated  Scar Care: To improve the appearance of your scar, you can massage the healed area (using circular motions with your fingertip) for 5 minutes 3x a day after your steri-strips peel away  You can use regular hand lotion or Scar zone from the store  You may also use Silicone pads after the stitches are removed (available at the store)  Redness and bumpiness of the scar are expected  These generally improve as healing progresses, but redness can be expected for up to 6-8 months  ** If you have any questions or concerns, please call our office  [phone]  _____________________________________________________________________________________  Pain Management  Pain after an injury or surgery is common and should often be expected  There are many ways to manage and reduce this pain  This often does not include medications or may not exclusively include medication  Each patient, surgery and surgeon are unique, and the approach to management of pain is individual   It is important to try to discuss your concerns and expectations regarding pain with your surgical team before and after surgery  They want to help you get better and have a good patient experience  Your patient experience includes understanding and treating your pain  Here's what you may expect before surgery and how to manage your pain and medications after surgery   Again, the approach to pain management after injury or surgery is individualized, and this is general information  Your surgical team will have more specific recommendations for you  Before using any of the methods explored here, please discuss with your medical team if these pain management methods are appropriate for you  We advise good communication with your team to let them help you achieve the best outcome  Surgery Day  As your surgery begins, your surgeon and anesthesia team may give you medications by mouth, IV, and/or injection  Giving you medication as the surgery progresses not only helps you to decrease pain during the surgery, but it also reduces your pain post-surgery  You may also receive medication in the recovery room after surgery, if needed  In some cases, you will receive prescription pain medication and instructions for its use to use at home in the days following your surgery  You may also receive instructions on using over-the-counter pain medications  It is important to follow these directions carefully, as many over-the counter medications contain some of the same ingredients as prescription pain medications and using them together can result in a dangerous accidental overdose  Post-Surgery Pain Management  While always important to follow your specific postoperative instructions, here are some different methods, outside of medication, that your team may recommend to reduce your pain:   Elevate: Elevating the injured area so it is higher than your heart can reduce swelling and pain  Swelling can increase quickly by putting your hand at your side, and this can make your dressing feel tight  Often, the pain associated with swelling is difficult to control, so it is best to avoid this problem   Take care of your dressing: If your dressing/splint feels tight, and elevation for 10 minutes does not improve the tight sensation, contact your surgical team  It may be recommended that you unravel any tape or elastic wrap and loosen the outer bandage   If this does not help, you may be advised to tear, unravel, or cut the inner layers with blunt tipped scissors  Make sure you are cutting on the opposite side of where your incision is located  When done, you will need to try to rebuild your dressing to keep your wound clean and covered  Before doing any of this, check with your medical team  They may want to be aware of the tight dressing and could have different instructions for loosening the dressing   Keep moving: If allowed by your surgeon, try to frequently move the fingers, wrist, elbow, and/or shoulder that are outside of the splint or cast  You can do this gently and slowly  This improves blood flow, which limits swelling and prevents bandages from feeling tight  It may be uncomfortable to move at first, but the discomfort will often improve with time and frequently improves with motion  Your surgeon will be more specific about what to move and what to rest    Ice the area: Icing the painful area will typically reduce swelling and inflammation and reduce pain  However, there may be certain procedures (such as surgery on arteries, skin grafts or flaps) where ice could be harmful, so consult your surgeon before using ice   Heat the area: If you are in the phase of care where you can remove your dressing or splint, you may be able to try heat  Heat increases blood flow to an area and can help with muscle spasms, muscle soreness and joint pain   Avoid smoking: Chemicals present in cigarettes can increase pain  Reducing or quitting smoking can improve your pain   Consume vitamin C: Consuming 500mg of vitamin C daily for 6 weeks may reduce pain after some injuries  However, it is ascorbic acid, which can upset your stomach if you have heartburn or gastritis  Post-Surgery Medication Management  The pain-management methods listed above are often effective when used in combination with taking medications post-surgery   There are many different classes of medication that can help pain  Some can be purchased over the counter, and some require a prescription  All medicines can have some benefits and some adverse reactions/side effects  Your surgical team will balance these issues to provide a plan for you  Some commonly prescribed medications can include:   Tylenol (Acetaminophen)   Aleve (Naprosyn/naproxen)   Motrin/Advil (Ibuprofen)   Celebrex (Celecoxib)   Toradol (Ketorolac)    When taking medication, keep the following in mind:   It may take 30-60 minutes for your body to absorb the medication after you take it by mouth, so be patient   Longer-acting medications used before bedtime may help you sleep better the first few nights after surgery   The first few nights post-surgery will generally be the toughest    Do not exceed the dose recommended by your physician or combine medications without consulting with your physician  If you are unfamiliar with these medications, your surgeon can specify how much medication you should take, for how long, and how often  Opioids  Opioids are a type of pain medication made from the poppy plant that is used to make opium and heroin  They can be effective in treating pain, but opioids should be used at a last resort, in limited amounts, and for a limited number of days  Use of these medications should only be done under the guidance of your doctor  When taking opioids, you are at risk of becoming dependent on the medicine, and they may become less effective over time  Oxycodone and hydrocodone are two of the most commonly used and effective opioid pain pills  These pills are frequently combined with Tylenol (acetaminophen), but it must be done carefully  Be sure to consult your surgeon before doing so   Your surgeon will give you a customized plan for managing your pain based on your type of surgery, number of procedures, duration of surgery, etc  Keep in mind that many opioids are combined with Tylenol (acetaminophen) already in the pill, so take care to follow your prescribed directions and not to take more opioids or acetaminophen than prescribed  Overdoses of each of these medications can be dangerous and life threatening  Learn more about opioids, including the side effects, how to safely use them, and how to properly dispose of any extras  Following the program below will greatly decrease your post-operative pain  1  Aleve (naproxen) 220 mg and Tylenol Arthritis 650 mg on the afternoon/evening of surgery  Do NOT take Aleve if you have a history of gastric ulcers, uncontrolled reflux or have been told previously by a physician that you should not take anti-inflammatory medications such as Advil/Aleve/Motrin  2  Aleve (naproxen) 220 mg in the morning and afternoon, for about 2-3 days after the surgery; even if you have no pain  You can stop two days after surgery if your hand does not hurt  3  Tylenol Arthritis (or any brand of acetaminophen 8-hour), 650 mg every eight hours, with a maximum dose of 3000 mg per day, for about 2-3 days after the surgery, even if you have no pain  Tylenol Arthritis plus Aleve is a case of 1+1=3, not 2  That is, they work together as a team to make each other stronger a  The maximum amount of Tylenol is 3000 mg per day, which is the same as 4 of the 650 mg pills  Remember; don't substitute any other medication for the Tylenol: don't take Motrin, aspirin, or any other over the counter medication  It must be Tylenol (or any brand of acetaminophen) for it to work as a team  Remember as well that Tylenol Arthritis is taken every 8 hours, the Aleve is twice a day  4  Norco (hydrocodone/acetaminophen) 5/325 mg or a similar medication to assist with sleeping at night, and possibly every 6 hours during the day, ONLY IF NEEDED, for the first few days   You will be given a written prescription, but many patients find they do not need to take any or all of the Norco  Do not take the medication just because it was given to you; only take it if you need it  Remember that Walter Cm is an opioid pain medication and can lead to addiction, respiratory sedation, and death  In 2015 over 17,500 Americans  from opioid overdoses and I don't want this to happen to you  Opioid medications can also cause constipation, so please plan for that, as well as possible mental confusion and drowsiness  Do not drive while you are taking this medication  With this protocol, you can expect your post-operative pain to be very manageable  The worst pain only lasts for the first 48 hours and improves significantly after that  By the time you see your surgeon for your post-operative visit you probably will no longer require any pain medication on a daily basis  Important Information about Painkillers  You are being prescribed an opioid pain medication to help with severe pain after surgery  Use the medication sparingly as needed to reduce your pain  The goal is not to be pain-free, but to make the pain more tolerable  If you are able to take non-steroidal anti-inflammatory drugs (NSAIDs), alternate the prescription pain medication with over-the-counter Ibuprofen or Naproxen (if you do not have a history of reflux or stomach ulcers)  This will allow you to take less opioid medication  Also, elevate the hand to reduce swelling and consider applying ice to the affected area for 15 minutes at a time, several times per day  Opioid medication is powerful and has the risk of overdose, abuse, and addiction  Only use the medication as directed by your physician and keep the pills in a safe place  When you no longer need the medication, please dispose of the pills properly as directed below  Allowing someone else to use your opioid prescription is illegal   Also, possible side effects from opioid medications are over-sedation, itching, nausea/vomiting, and constipation   Do not drive a vehicle or operate machinery while taking the pain medications  Drink plenty of fluids and consider a stool softener to prevent constipation  If the pain you are experiencing is not severe, stop taking the opioid pills and only take over-the-counter medications such as Tylenol and Ibuprofen  Disposing of unused pain medications:  (1) Follow pharmacist instructions on the bottle if available, or  (2) Call 7-277.769.1648 for a [facility] authorized collection site in your area, or  (3) If no collection site is available in your area, mix the pills with an undesirable substance such as used coffee grounds, david litter, or dirt  Place this mixture in a sealed plastic bag  Place the bag in the household garbage  Adapted from the opioid awareness section of the [employer], thanks to Shazia Mari and Jeremy Cole

## 2021-08-25 NOTE — H&P
ASSESSMENT/PLAN:    Assessment:   Left Carpal Tunnel Syndrome  Right Carpal Tunnel Syndrome    Plan:   Schedule for R Endoscopic Carpal Tunnel Release and then for Left Endoscopic Carpal Tunnel release 2 weeks afterwards    Follow Up: After Surgery    To Do Next Visit:  Sutures out    Operative Discussions:     Endoscopic Carpal Tunnel Release: The anatomy and physiology of carpal tunnel syndrome was discussed with the patient today  Increase pressure localized under the transverse carpal ligament can cause pain, numbness, tingling, or dysesthesias within the median nerve distribution as well as feelings of fatigue, clumsiness, or awkwardness  These symptoms typically occur at night and worse in the morning upon waking  Eventually, untreated carpal tunnel syndrome can result in weakness and permanent loss of muscle within the thenar compartment of the hand  Treatment options were discussed with the patient  Conservative treatment includes nocturnal resting splints to keep the nerve in a neutral position, ergonomic changes within the work or home environment, activity modification, and tendon gliding exercises  Steroid injections within the carpal canal can help a majority of patients, however this is often self-limited in a majority of patients  Surgical intervention to divide the transverse carpal ligament typically results in a long-lasting relief of the patient's complaints, with the recurrence rate of less than 1%  The patient has elected to undergo an endoscopic carpal tunnel release  The 2 incision technique was discussed with the patient, which results in approximately a two-week less recovery time, and less wound complications  In the postoperative period, light activities are allowed immediately, driving is allowed when narcotic medication has stopped, and the bandages may be removed and incision may get wet after 2 days    Heavy activities (lifting more than approximately 10 pounds) will be allowed after follow up appointment in 1-2 weeks  While the pain and discomfort in the hands generally improves rapidly, the numbness and tingling as well as the strength will slowly improve over weeks to months depending on the severity of the carpal tunnel syndrome  Pillar pain was discussed with the patient, which is typically a common but self-limiting condition  The risks of bleeding and infection from the surgery are less than 1%  Risk of recurrence is approximately 0 5%  The risks of nerve injury or nerve damage or damage to the blood vessels is approximately 1 in 1200  The patient has an understanding of the above mentioned discussion  The risks and benefits of the procedure were explained to the patient, which include, but are not limited to: Bleeding, infection, recurrence, pain, scar, damage to tendons, damage to nerves, and damage to blood vessels, failure to give desired results and complications related to anesthesia   These risks, along with alternative conservative treatment options, and postoperative protocols were voiced back and understood by the patient   All questions were answered to the patient's satisfaction   The patient agrees to comply with a standard postoperative protocol, and is willing to proceed   Education was provided via written and auditory forms   There were no barriers to learning  Written handouts regarding wound care, incision and scar care, and general preoperative information was provided to the patient   Prior to surgery, the patient may be requested to stop all anti-inflammatory medications   Prophylactic aspirin, Plavix, and Coumadin may be allowed to be continued   Medications including vitamin E , ginkgo, and fish oil are requested to be stopped approximately one week prior to surgery   Hypertensive medications and beta blockers, if taken, should be continued      _____________________________________________________  CHIEF COMPLAINT:  Chief Complaint   Patient presents with    Left Wrist - Numbness, Tingling    Right Wrist - Numbness, Tingling         SUBJECTIVE:  Carlotta Taylor is a 39 y o  male who presents with Numbness to the bilateral hand, index finger, long finger and thumb  This started >6 month(s) ago as Insidious  Radiation: Yes to the  index finger, long finger and thumb  Previous Treatments: steroid injections, NSAIDs, activity modification and bracing without relief  Associated symptoms: Numbness  Handedness: left  Work status: works in a computer    PAST MEDICAL HISTORY:  Past Medical History:   Diagnosis Date    Asthma        PAST SURGICAL HISTORY:  No past surgical history on file  FAMILY HISTORY:  No family history on file  SOCIAL HISTORY:  Social History     Tobacco Use    Smoking status: Former Smoker    Smokeless tobacco: Never Used   Substance Use Topics    Alcohol use: Yes     Comment: rare    Drug use: No       MEDICATIONS:    Current Outpatient Medications:     cetirizine (ZyrTEC) 10 mg tablet, Take 10 mg by mouth daily, Disp: , Rfl:     lidocaine (LIDODERM) 5 %, Apply 1 patch topically daily Remove & Discard patch within 12 hours or as directed by MD, Disp: 30 patch, Rfl: 0    naproxen (NAPROSYN) 500 mg tablet, Take 1 tablet (500 mg total) by mouth 2 (two) times a day with meals for 7 days, Disp: 14 tablet, Rfl: 0    omeprazole (PriLOSEC) 20 mg delayed release capsule, Take 1 capsule (20 mg total) by mouth daily, Disp: 30 capsule, Rfl: 0    ALLERGIES:  Allergies   Allergen Reactions    Penicillins      Unknown, childhood allergy       REVIEW OF SYSTEMS:  Pertinent items are noted in HPI  A comprehensive review of systems was negative      LABS:  HgA1c: No results found for: HGBA1C  BMP:   Lab Results   Component Value Date    GLUCOSE 102 08/18/2015    CALCIUM 8 8 05/07/2021     08/18/2015    K 4 0 05/07/2021    CO2 27 05/07/2021     05/07/2021    BUN 9 05/07/2021    CREATININE 0 92 05/07/2021 _____________________________________________________  PHYSICAL EXAMINATION:  Vital signs: There were no vitals taken for this visit  General: well developed and well nourished, alert, oriented times 3 and appears comfortable  Psychiatric: Normal  HEENT: Trachea Midline, No torticollis  Cardiovascular: No discernable arrhythmia  Pulmonary: No wheezing or stridor  Abdomen: No rebound or guarding  Extremities: No peripheral edema  Skin: No masses, erythema, lacerations, fluctation, ulcerations  Neurovascular: Sensation Intact to the Median, Ulnar, Radial Nerve, Motor Intact to the Median, Ulnar, Radial Nerve and Pulses Intact    MUSCULOSKELETAL EXAMINATION:  LEFT SIDE:  Carpal tunnel:  No atrophy thenar muscles, Postive Tinel's, Positive Derkin's Compression Test and APB 4+/5  RIGHT SIDE: Carpal tunnel:  No atrophy thenar muscles, Postive Tinel's, Positive Derkin's Compression Test and APB 4+/5    _____________________________________________________  STUDIES REVIEWED:  EMG limited result obtained through Care Everywhere showed Bilateral Carpal Tunnel Syndrome      PROCEDURES PERFORMED:  Procedures         I interviewed, took the history and examined the patient  I discuss the case with the resident and reviewed the resident's note  I supervised the resident and I agree with the resident management plan as it was presented to me  I was present in the clinic and examined the patient

## 2021-08-26 ENCOUNTER — TELEPHONE (OUTPATIENT)
Dept: OBGYN CLINIC | Facility: HOSPITAL | Age: 41
End: 2021-08-26

## 2021-08-26 NOTE — TELEPHONE ENCOUNTER
[facility]    Patient will be faxing paperwork from employer regarding his sx  Patient is asking if he can be prescribed pain med to take before the sx? Preferred pharmacy is [facility] in [place]        # [phone]

## 2021-09-03 ENCOUNTER — TELEPHONE (OUTPATIENT)
Dept: OBGYN CLINIC | Facility: HOSPITAL | Age: 41
End: 2021-09-03

## 2021-09-03 NOTE — TELEPHONE ENCOUNTER
Patient would like someone to call him to confirm receipt of the [facility] paper work he faxed this morning to [phone]      [phone]

## 2021-09-29 NOTE — TELEPHONE ENCOUNTER
Patient called in to see an update on his FMLA, in the [employer] log it is blank  I did advised of the Turn Around Time  He said he needs it completed by tomorrow      He is just asking a call back to see status    C/b # [phone]

## 2021-10-02 ENCOUNTER — IMMUNIZATIONS (OUTPATIENT)
Dept: FAMILY MEDICINE CLINIC | Facility: HOSPITAL | Age: 41
End: 2021-10-02

## 2021-10-02 DIAGNOSIS — Z23 ENCOUNTER FOR IMMUNIZATION: Primary | ICD-10-CM

## 2021-10-02 PROCEDURE — 0001A SARS-COV-2 / COVID-19 MRNA VACCINE (PFIZER-BIONTECH) 30 MCG: CPT

## 2021-10-02 PROCEDURE — 91300 SARS-COV-2 / COVID-19 MRNA VACCINE (PFIZER-BIONTECH) 30 MCG: CPT

## 2021-10-12 ENCOUNTER — HOSPITAL ENCOUNTER (OUTPATIENT)
Facility: HOSPITAL | Age: 41
Setting detail: OUTPATIENT SURGERY
Discharge: HOME | End: 2021-10-12
Attending: ORTHOPAEDIC SURGERY | Admitting: ORTHOPAEDIC SURGERY
Payer: COMMERCIAL

## 2021-10-12 VITALS
HEIGHT: 72 IN | HEART RATE: 91 BPM | RESPIRATION RATE: 18 BRPM | DIASTOLIC BLOOD PRESSURE: 75 MMHG | OXYGEN SATURATION: 100 % | SYSTOLIC BLOOD PRESSURE: 143 MMHG | TEMPERATURE: 98.3 F | BODY MASS INDEX: 39.96 KG/M2 | WEIGHT: 295 LBS

## 2021-10-12 DIAGNOSIS — G56.01 CARPAL TUNNEL SYNDROME ON RIGHT: Primary | ICD-10-CM

## 2021-10-12 PROCEDURE — NC001 PR NO CHARGE: Performed by: ORTHOPAEDIC SURGERY

## 2021-10-12 PROCEDURE — 29848 WRIST ENDOSCOPY/SURGERY: CPT | Performed by: ORTHOPAEDIC SURGERY

## 2021-10-12 PROCEDURE — 29848 WRIST ENDOSCOPY/SURGERY: CPT | Performed by: PHYSICIAN ASSISTANT

## 2021-10-12 RX ORDER — NAPROXEN SODIUM 220 MG
220 TABLET ORAL 2 TIMES DAILY WITH MEALS
Qty: 10 TABLET | Refills: 0 | Status: SHIPPED | OUTPATIENT
Start: 2021-10-12 | End: 2021-10-26 | Stop reason: HOSPADM

## 2021-10-12 RX ORDER — DIPHENOXYLATE HYDROCHLORIDE AND ATROPINE SULFATE 2.5; .025 MG/1; MG/1
1 TABLET ORAL DAILY
COMMUNITY

## 2021-10-12 RX ORDER — SENNOSIDES 8.6 MG
650 CAPSULE ORAL EVERY 8 HOURS
Qty: 15 TABLET | Refills: 0 | Status: SHIPPED | OUTPATIENT
Start: 2021-10-12 | End: 2021-10-26 | Stop reason: HOSPADM

## 2021-10-12 RX ORDER — MAGNESIUM HYDROXIDE 1200 MG/15ML
LIQUID ORAL AS NEEDED
Status: DISCONTINUED | OUTPATIENT
Start: 2021-10-12 | End: 2021-10-12 | Stop reason: HOSPADM

## 2021-10-12 RX ORDER — HYDROCODONE BITARTRATE AND ACETAMINOPHEN 5; 325 MG/1; MG/1
1 TABLET ORAL EVERY 6 HOURS PRN
Qty: 5 TABLET | Refills: 0 | Status: SHIPPED | OUTPATIENT
Start: 2021-10-12 | End: 2021-10-17

## 2021-10-12 RX ADMIN — SODIUM BICARBONATE: 84 INJECTION, SOLUTION INTRAVENOUS at 08:53

## 2021-10-26 ENCOUNTER — HOSPITAL ENCOUNTER (OUTPATIENT)
Facility: HOSPITAL | Age: 41
Setting detail: OUTPATIENT SURGERY
Discharge: HOME | End: 2021-10-26
Attending: ORTHOPAEDIC SURGERY | Admitting: ORTHOPAEDIC SURGERY
Payer: COMMERCIAL

## 2021-10-26 VITALS
RESPIRATION RATE: 20 BRPM | WEIGHT: 295 LBS | BODY MASS INDEX: 39.96 KG/M2 | DIASTOLIC BLOOD PRESSURE: 93 MMHG | OXYGEN SATURATION: 95 % | HEART RATE: 99 BPM | HEIGHT: 72 IN | SYSTOLIC BLOOD PRESSURE: 138 MMHG | TEMPERATURE: 98.9 F

## 2021-10-26 DIAGNOSIS — G56.02 CARPAL TUNNEL SYNDROME ON LEFT: Primary | ICD-10-CM

## 2021-10-26 PROCEDURE — 29848 WRIST ENDOSCOPY/SURGERY: CPT | Performed by: ORTHOPAEDIC SURGERY

## 2021-10-26 RX ORDER — MAGNESIUM HYDROXIDE 1200 MG/15ML
LIQUID ORAL AS NEEDED
Status: DISCONTINUED | OUTPATIENT
Start: 2021-10-26 | End: 2021-10-26 | Stop reason: HOSPADM

## 2021-10-26 RX ORDER — SENNOSIDES 8.6 MG
650 CAPSULE ORAL EVERY 8 HOURS PRN
Qty: 30 TABLET | Refills: 0 | Status: SHIPPED | OUTPATIENT
Start: 2021-10-26 | End: 2022-02-24

## 2021-10-26 RX ORDER — HYDROCODONE BITARTRATE AND ACETAMINOPHEN 5; 325 MG/1; MG/1
1 TABLET ORAL EVERY 6 HOURS PRN
Qty: 5 TABLET | Refills: 0 | Status: SHIPPED | OUTPATIENT
Start: 2021-10-26 | End: 2021-10-31

## 2021-10-26 RX ORDER — NAPROXEN SODIUM 220 MG
220 TABLET ORAL 2 TIMES DAILY WITH MEALS
Qty: 20 TABLET | Refills: 0 | Status: SHIPPED | OUTPATIENT
Start: 2021-10-26 | End: 2022-02-24

## 2021-10-26 RX ADMIN — SODIUM BICARBONATE: 84 INJECTION, SOLUTION INTRAVENOUS at 08:45

## 2021-11-05 ENCOUNTER — OFFICE VISIT (OUTPATIENT)
Dept: OBGYN CLINIC | Facility: HOSPITAL | Age: 41
End: 2021-11-05

## 2021-11-05 VITALS
HEART RATE: 89 BPM | BODY MASS INDEX: 39.96 KG/M2 | HEIGHT: 72 IN | WEIGHT: 295 LBS | DIASTOLIC BLOOD PRESSURE: 88 MMHG | SYSTOLIC BLOOD PRESSURE: 141 MMHG

## 2021-11-05 DIAGNOSIS — Z47.89 AFTERCARE FOLLOWING SURGERY OF THE MUSCULOSKELETAL SYSTEM: Primary | ICD-10-CM

## 2021-11-05 PROCEDURE — 99024 POSTOP FOLLOW-UP VISIT: CPT | Performed by: ORTHOPAEDIC SURGERY

## 2021-12-31 ENCOUNTER — HOSPITAL ENCOUNTER (EMERGENCY)
Facility: HOSPITAL | Age: 41
Discharge: HOME | End: 2021-12-31
Attending: EMERGENCY MEDICINE | Admitting: EMERGENCY MEDICINE
Payer: COMMERCIAL

## 2021-12-31 ENCOUNTER — APPOINTMENT (EMERGENCY)
Dept: RADIOLOGY | Facility: HOSPITAL | Age: 41
End: 2021-12-31
Payer: COMMERCIAL

## 2021-12-31 VITALS
HEIGHT: 72 IN | BODY MASS INDEX: 38.6 KG/M2 | WEIGHT: 285 LBS | DIASTOLIC BLOOD PRESSURE: 78 MMHG | OXYGEN SATURATION: 98 % | HEART RATE: 97 BPM | TEMPERATURE: 98.9 F | SYSTOLIC BLOOD PRESSURE: 146 MMHG | RESPIRATION RATE: 17 BRPM

## 2021-12-31 DIAGNOSIS — R00.2 PALPITATIONS: Primary | ICD-10-CM

## 2021-12-31 LAB
ALBUMIN SERPL BCP-MCNC: 4 G/DL (ref 3.5–5)
ALP SERPL-CCNC: 94 U/L (ref 46–116)
ALT SERPL W P-5'-P-CCNC: 63 U/L (ref 12–78)
ANION GAP SERPL CALCULATED.3IONS-SCNC: 9 MMOL/L (ref 4–13)
AST SERPL W P-5'-P-CCNC: 29 U/L (ref 5–45)
BASOPHILS # BLD AUTO: 0.04 THOUSANDS/ΜL (ref 0–0.1)
BASOPHILS NFR BLD AUTO: 1 % (ref 0–1)
BILIRUB DIRECT SERPL-MCNC: 0.08 MG/DL (ref 0–0.2)
BILIRUB SERPL-MCNC: 0.42 MG/DL (ref 0.2–1)
BUN SERPL-MCNC: 9 MG/DL (ref 5–25)
CALCIUM SERPL-MCNC: 9.1 MG/DL (ref 8.3–10.1)
CARDIAC TROPONIN I PNL SERPL HS: 2 NG/L
CHLORIDE SERPL-SCNC: 103 MMOL/L (ref 100–108)
CO2 SERPL-SCNC: 26 MMOL/L (ref 21–32)
CREAT SERPL-MCNC: 0.99 MG/DL (ref 0.6–1.3)
EOSINOPHIL # BLD AUTO: 0.25 THOUSAND/ΜL (ref 0–0.61)
EOSINOPHIL NFR BLD AUTO: 3 % (ref 0–6)
ERYTHROCYTE [DISTWIDTH] IN BLOOD BY AUTOMATED COUNT: 13.2 % (ref 11.6–15.1)
FLUAV RNA RESP QL NAA+PROBE: NEGATIVE
FLUBV RNA RESP QL NAA+PROBE: NEGATIVE
GFR SERPL CREATININE-BSD FRML MDRD: 94 ML/MIN/1.73SQ M
GLUCOSE SERPL-MCNC: 91 MG/DL (ref 65–140)
HCT VFR BLD AUTO: 47.6 % (ref 36.5–49.3)
HGB BLD-MCNC: 15.9 G/DL (ref 12–17)
IMM GRANULOCYTES # BLD AUTO: 0.04 THOUSAND/UL (ref 0–0.2)
IMM GRANULOCYTES NFR BLD AUTO: 1 % (ref 0–2)
LYMPHOCYTES # BLD AUTO: 1.95 THOUSANDS/ΜL (ref 0.6–4.47)
LYMPHOCYTES NFR BLD AUTO: 22 % (ref 14–44)
MCH RBC QN AUTO: 27.9 PG (ref 26.8–34.3)
MCHC RBC AUTO-ENTMCNC: 33.4 G/DL (ref 31.4–37.4)
MCV RBC AUTO: 84 FL (ref 82–98)
MONOCYTES # BLD AUTO: 0.47 THOUSAND/ΜL (ref 0.17–1.22)
MONOCYTES NFR BLD AUTO: 5 % (ref 4–12)
NEUTROPHILS # BLD AUTO: 5.97 THOUSANDS/ΜL (ref 1.85–7.62)
NEUTS SEG NFR BLD AUTO: 68 % (ref 43–75)
NRBC BLD AUTO-RTO: 0 /100 WBCS
PLATELET # BLD AUTO: 327 THOUSANDS/UL (ref 149–390)
PMV BLD AUTO: 9.4 FL (ref 8.9–12.7)
POTASSIUM SERPL-SCNC: 4 MMOL/L (ref 3.5–5.3)
PROT SERPL-MCNC: 7.9 G/DL (ref 6.4–8.2)
RBC # BLD AUTO: 5.7 MILLION/UL (ref 3.88–5.62)
RSV RNA RESP QL NAA+PROBE: NEGATIVE
SARS-COV-2 RNA RESP QL NAA+PROBE: NEGATIVE
SODIUM SERPL-SCNC: 138 MMOL/L (ref 136–145)
WBC # BLD AUTO: 8.72 THOUSAND/UL (ref 4.31–10.16)

## 2021-12-31 PROCEDURE — 80076 HEPATIC FUNCTION PANEL: CPT | Performed by: EMERGENCY MEDICINE

## 2021-12-31 PROCEDURE — 0241U HB NFCT DS VIR RESP RNA 4 TRGT: CPT | Performed by: EMERGENCY MEDICINE

## 2021-12-31 PROCEDURE — 99285 EMERGENCY DEPT VISIT HI MDM: CPT | Performed by: EMERGENCY MEDICINE

## 2021-12-31 PROCEDURE — 36415 COLL VENOUS BLD VENIPUNCTURE: CPT | Performed by: EMERGENCY MEDICINE

## 2021-12-31 PROCEDURE — 80048 BASIC METABOLIC PNL TOTAL CA: CPT | Performed by: EMERGENCY MEDICINE

## 2021-12-31 PROCEDURE — 99284 EMERGENCY DEPT VISIT MOD MDM: CPT

## 2021-12-31 PROCEDURE — 71046 X-RAY EXAM CHEST 2 VIEWS: CPT

## 2021-12-31 PROCEDURE — 85025 COMPLETE CBC W/AUTO DIFF WBC: CPT | Performed by: EMERGENCY MEDICINE

## 2021-12-31 PROCEDURE — 84484 ASSAY OF TROPONIN QUANT: CPT | Performed by: EMERGENCY MEDICINE

## 2021-12-31 PROCEDURE — 93005 ELECTROCARDIOGRAM TRACING: CPT

## 2021-12-31 NOTE — DISCHARGE INSTRUCTIONS
Your exam and testing was very reassuring  Please follow up with the  cardiologist and your PCP, a referral has been put in the system  Anything that feels like its moving in the wrong direction, please come back to the ER  We are always here and always happy to re-evaluate!

## 2021-12-31 NOTE — ED PROVIDER NOTES
History  Chief Complaint   Patient presents with    Hypertension     pt reports "not feeling well" two days ago, "felt like heart was pounding"  has a home bp cuff, states it was 140's/90's  denies hx  38 yo male with h/o asthma, obesity p/w concern for HTN  Pt reports episodes of palpiations that was intermittent and lasted around 30 minutes the night before last   He checked his BP at that time was noted to be 140's/90s  Denies associated chest pain/pressure, SOB, dizziness, feeling faint, nauseated, or sweaty  Pt had a similar episode last night prompting his visit  Denies recent stimulant use, excessive caffeeine, GI losses, f/c/n/n/d, exertional chest pain or change in exercise tolerance  No h/o recent travel/surgery/VTE or familial VTE  Reports h/o Echo in May that was wnl  Denies h/o stress test   Pt feels at baseline currently          History provided by:  Medical records and patient   used: No    Palpitations  Palpitations quality:  Fast  Onset quality:  Sudden  Duration:  30 minutes  Timing:  Intermittent  Progression:  Resolved  Chronicity:  New  Context: anxiety    Context: not appetite suppressants, not blood loss, not bronchodilators, not caffeine, not dehydration, not exercise, not hyperventilation, not illicit drugs, not nicotine and not stimulant use    Relieved by:  Nothing  Worsened by:  Nothing  Ineffective treatments:  None tried  Associated symptoms: no back pain, no chest pain, no chest pressure, no cough, no diaphoresis, no dizziness, no hemoptysis, no leg pain, no lower extremity edema, no malaise/fatigue, no nausea, no near-syncope, no numbness, no orthopnea, no PND, no shortness of breath, no syncope, no vomiting and no weakness    Risk factors: no diabetes mellitus, no heart disease, no hx of atrial fibrillation, no hx of DVT, no hx of PE, no hx of thyroid disease, no hypercoagulable state, no hyperthyroidism, no OTC sinus medications and no stress Prior to Admission Medications   Prescriptions Last Dose Informant Patient Reported? Taking?   acetaminophen (TYLENOL) 650 mg CR tablet   No No   Sig: Take 1 tablet (650 mg total) by mouth every 8 (eight) hours as needed for mild pain   cetirizine (ZyrTEC) 10 mg tablet  Self Yes No   Sig: Take 10 mg by mouth daily   multivitamin (THERAGRAN) TABS  Self Yes No   Sig: Take 1 tablet by mouth daily   naproxen sodium (ALEVE) 220 MG tablet   No No   Sig: Take 1 tablet (220 mg total) by mouth 2 (two) times a day with meals      Facility-Administered Medications: None       Past Medical History:   Diagnosis Date    Asthma        Past Surgical History:   Procedure Laterality Date    ME WRIST [place] LIG Right 10/12/2021    Procedure: Right endoscopic carpal tunnel release;  Surgeon: Huma Cotton MD;  Location: [facility];  Service: Orthopedics    ME WRIST [place] LIG Left 10/26/2021    Procedure: RELEASE CARPAL TUNNEL ENDOSCOPIC;  Surgeon: Huma Cotton MD;  Location: [facility];  Service: Orthopedics       History reviewed  No pertinent family history  I have reviewed and agree with the history as documented  E-Cigarette/Vaping     E-Cigarette/Vaping Substances     Social History     Tobacco Use    Smoking status: Former Smoker    Smokeless tobacco: Never Used   Substance Use Topics    Alcohol use: Yes     Comment: rare    Drug use: No       Review of Systems   Constitutional: Negative for diaphoresis and malaise/fatigue  HENT: Negative for rhinorrhea and sore throat  Respiratory: Negative for cough, hemoptysis and shortness of breath  Cardiovascular: Negative for chest pain, orthopnea, syncope, PND and near-syncope  Gastrointestinal: Negative for nausea and vomiting  Musculoskeletal: Negative for back pain  Skin: Negative for rash  Neurological: Negative for dizziness, weakness and numbness     All other systems reviewed and are negative  Physical Exam  Physical Exam  Vitals and nursing note reviewed  Constitutional:       Appearance: He is well-developed  He is not diaphoretic  HENT:      Head: Normocephalic and atraumatic  Eyes:      Conjunctiva/sclera: Conjunctivae normal    Cardiovascular:      Rate and Rhythm: Normal rate and regular rhythm  Pulses: Normal pulses  Heart sounds: Normal heart sounds  No murmur heard  Pulmonary:      Effort: Pulmonary effort is normal  No respiratory distress  Breath sounds: Normal breath sounds  Comments: No clinical signs of VTE  Abdominal:      Palpations: Abdomen is soft  Musculoskeletal:         General: No deformity  Normal range of motion  Cervical back: Normal range of motion  Skin:     General: Skin is warm and dry  Neurological:      Mental Status: He is alert and oriented to person, place, and time  Psychiatric:         Behavior: Behavior normal          Thought Content:  Thought content normal          Judgment: Judgment normal          Vital Signs  ED Triage Vitals [12/31/21 1019]   Temperature Pulse Respirations Blood Pressure SpO2   98 9 °F (37 2 °C) 104 20 150/88 96 %      Temp Source Heart Rate Source Patient Position - Orthostatic VS BP Location FiO2 (%)   Oral Monitor Sitting Left arm --      Pain Score       --           Vitals:    12/31/21 1019 12/31/21 1343 12/31/21 1447   BP: 150/88 (!) 175/92 146/78   Pulse: 104 87 97   Patient Position - Orthostatic VS: Sitting           Visual Acuity  Visual Acuity      Most Recent Value   L Pupil Size (mm) 3   R Pupil Size (mm) 3          ED Medications  Medications - No data to display    Diagnostic Studies  Results Reviewed     Procedure Component Value Units Date/Time    HS Troponin I 2hr [[id number]]     Lab Status: No result Specimen: Blood     HS Troponin 0hr (reflex protocol) [[id number]]  (Normal) Collected: 12/31/21 1339    Lab Status: Final result Specimen: Blood from Arm, Left Updated: 12/31/21 1457     hs TnI 0hr 2 ng/L     COVID/FLU/RSV [[id number]]  (Normal) Collected: 12/31/21 1339    Lab Status: Final result Specimen: Nares from Nose Updated: 12/31/21 1442     SARS-CoV-2 Negative     INFLUENZA A PCR Negative     INFLUENZA B PCR Negative     RSV PCR Negative    Narrative:      FOR PEDIATRIC PATIENTS - copy/paste COVID Guidelines URL to browser: [email or web address]     This test has been authorized by FDA under an EUA (Emergency Use Assay) for use by authorized laboratories  Clinical caution and judgement should be used with the interpretation of these results with consideration of the clinical impression and other laboratory testing  Testing reported as "Positive" or "Negative" has been proven to be accurate according to standard laboratory validation requirements  All testing is performed with control materials showing appropriate reactivity at standard intervals      Basic metabolic panel [[id number]] Collected: 12/31/21 1339    Lab Status: Final result Specimen: Blood from Arm, Left Updated: 12/31/21 1415     Sodium 138 mmol/L      Potassium 4 0 mmol/L      Chloride 103 mmol/L      CO2 26 mmol/L      ANION GAP 9 mmol/L      BUN 9 mg/dL      Creatinine 0 99 mg/dL      Glucose 91 mg/dL      Calcium 9 1 mg/dL      eGFR 94 ml/min/1 73sq m     Narrative:      [place] guidelines for Chronic Kidney Disease (CKD):     Stage 1 with normal or high GFR (GFR > 90 mL/min/1 73 square meters)    Stage 2 Mild CKD (GFR = 60-89 mL/min/1 73 square meters)    Stage 3A Moderate CKD (GFR = 45-59 mL/min/1 73 square meters)    Stage 3B Moderate CKD (GFR = 30-44 mL/min/1 73 square meters)    Stage 4 Severe CKD (GFR = 15-29 mL/min/1 73 square meters)    Stage 5 End Stage CKD (GFR <15 mL/min/1 73 square meters)  Note: GFR calculation is accurate only with a steady state creatinine    Hepatic function panel [[id number]]  (Normal) Collected: 12/31/21 1339    Lab Status: Final result Specimen: Blood from Arm, Left Updated: 12/31/21 1415     Total Bilirubin 0 42 mg/dL      Bilirubin, Direct 0 08 mg/dL      Alkaline Phosphatase 94 U/L      AST 29 U/L      ALT 63 U/L      Total Protein 7 9 g/dL      Albumin 4 0 g/dL     CBC and differential [[id number]]  (Abnormal) Collected: 12/31/21 1339    Lab Status: Final result Specimen: Blood from Arm, Left Updated: 12/31/21 1401     WBC 8 72 Thousand/uL      RBC 5 70 Million/uL      Hemoglobin 15 9 g/dL      Hematocrit 47 6 %      MCV 84 fL      MCH 27 9 pg      MCHC 33 4 g/dL      RDW 13 2 %      MPV 9 4 fL      Platelets 890 Thousands/uL      nRBC 0 /100 WBCs      Neutrophils Relative 68 %      Immat GRANS % 1 %      Lymphocytes Relative 22 %      Monocytes Relative 5 %      Eosinophils Relative 3 %      Basophils Relative 1 %      Neutrophils Absolute 5 97 Thousands/µL      Immature Grans Absolute 0 04 Thousand/uL      Lymphocytes Absolute 1 95 Thousands/µL      Monocytes Absolute 0 47 Thousand/µL      Eosinophils Absolute 0 25 Thousand/µL      Basophils Absolute 0 04 Thousands/µL                  XR chest 2 views   Final Result by Rodriguez Murcia MD (12/31 1300)      No acute cardiopulmonary disease                    Workstation performed: [id number]                    Procedures  ECG 12 Lead Documentation Only    Date/Time: 12/31/2021 3:13 PM  Performed by: Marilin Yang MD  Authorized by: Marilin Yang MD     Indications / Diagnosis:  Palpiations  ECG reviewed by me, the ED Provider: yes    Patient location:  ED  Previous ECG:     Previous ECG:  Compared to current    Comparison ECG info:  5/7/2021    Similarity:  No change    Comparison to cardiac monitor: Yes    Interpretation:     Interpretation: normal    Rate:     ECG rate:  88 BPM    ECG rate assessment: normal    Rhythm:     Rhythm: sinus rhythm    Ectopy:     Ectopy: none    QRS:     QRS axis:  Normal  Conduction:     Conduction: normal    ST segments:     ST segments:  Normal  T waves:     T waves: normal    Comments:      No STEMI             ED Course  ED Course as of 12/31/21 1508   Fri Dec 31, 2021   1445 XR chest 2 views  IMPRESSION:     No acute cardiopulmonary disease       1445 CBC and differential(!)  wnl   1446 COVID/FLU/RSV  wnl   1446 Hepatic function panel  wnl   2400 Basic metabolic panel  wnl   6890 hs TnI 0hr: 2  wnl                                             MDM  Number of Diagnoses or Management Options  Palpitations  Diagnosis management comments: Pt with intermittent palpations and concern over elevated BP at home x 2 days  No chest pain/pressure, exertional symptoms, change in exercise tolerance  Pt PERC negative  No clinical signs of pulse differential, vol overload or VTE  Work up reassuring, EKG non-ischemic without s/s arrhythmia  Labs similarly reassuring including CTNI  Pt comfortable with discharge to home with cardiology/PCP follow up  RTER precautions discussed and documented on discharge paperwork, pt and family endorsed good understanding of reasons to return  Referral provided  Amount and/or Complexity of Data Reviewed  Clinical lab tests: ordered and reviewed  Tests in the radiology section of CPT®: ordered and reviewed  Tests in the medicine section of CPT®: ordered and reviewed  Review and summarize past medical records: yes  Independent visualization of images, tracings, or specimens: yes    Risk of Complications, Morbidity, and/or Mortality  Presenting problems: moderate  Diagnostic procedures: moderate  Management options: moderate    Patient Progress  Patient progress: stable      Disposition  Final diagnoses:   None     ED Disposition     None      Follow-up Information    None         Patient's Medications   Discharge Prescriptions    No medications on file       No discharge procedures on file      PDMP Review       Value Time User    PDMP Reviewed  Yes 10/26/2021  8:30 AM Sonya Fried PA-C          ED Provider  Electronically Signed by           Krista Cueva MD  12/31/21 6708

## 2022-01-02 LAB
ATRIAL RATE: 94 BPM
P AXIS: 62 DEGREES
PR INTERVAL: 164 MS
QRS AXIS: 10 DEGREES
QRSD INTERVAL: 86 MS
QT INTERVAL: 316 MS
QTC INTERVAL: 383 MS
T WAVE AXIS: 50 DEGREES
VENTRICULAR RATE: 88 BPM

## 2022-01-02 PROCEDURE — 93010 ELECTROCARDIOGRAM REPORT: CPT | Performed by: INTERNAL MEDICINE

## 2022-01-04 ENCOUNTER — CONSULT (OUTPATIENT)
Dept: CARDIOLOGY CLINIC | Facility: CLINIC | Age: 42
End: 2022-01-04
Payer: COMMERCIAL

## 2022-01-04 VITALS
SYSTOLIC BLOOD PRESSURE: 146 MMHG | HEART RATE: 112 BPM | WEIGHT: 306 LBS | OXYGEN SATURATION: 99 % | HEIGHT: 72 IN | DIASTOLIC BLOOD PRESSURE: 90 MMHG | BODY MASS INDEX: 41.45 KG/M2

## 2022-01-04 DIAGNOSIS — R00.2 PALPITATIONS: ICD-10-CM

## 2022-01-04 DIAGNOSIS — I10 PRIMARY HYPERTENSION: Primary | ICD-10-CM

## 2022-01-04 PROCEDURE — 93000 ELECTROCARDIOGRAM COMPLETE: CPT | Performed by: INTERNAL MEDICINE

## 2022-01-04 PROCEDURE — 99204 OFFICE O/P NEW MOD 45 MIN: CPT | Performed by: INTERNAL MEDICINE

## 2022-01-04 RX ORDER — HYDROCHLOROTHIAZIDE 12.5 MG/1
12.5 TABLET ORAL DAILY
COMMUNITY

## 2022-01-04 NOTE — PATIENT INSTRUCTIONS
Heart Palpitations   AMBULATORY CARE:   Heart palpitations  are feelings that your heart races, jumps, throbs, or flutters  You may feel extra beats, no beats for a short time, or skipped beats  You may have these feelings in your chest, throat, or neck  They may happen when you are sitting, standing, or lying  Heart palpitations may be frightening, but are usually not caused by a serious problem  Call 911 or have someone else call for any of the following:   · You have any of the following signs of a heart attack:      ? Squeezing, pressure, or pain in your chest    ? You may  also have any of the following:     § Discomfort or pain in your back, neck, jaw, stomach, or arm    § Shortness of breath    § Nausea or vomiting    § Lightheadedness or a sudden cold sweat    · You have any of the following signs of a stroke:      ? Numbness or drooping on one side of your face     ? Weakness in an arm or leg    ? Confusion or difficulty speaking    ? Dizziness, a severe headache, or vision loss    · You faint or lose consciousness  Seek care immediately if:   · Your palpitations happen more often or last longer than usual      · You have palpitations and shortness of breath, nausea, sweating, or dizziness  Contact your healthcare provider if:   · You have questions or concerns about your condition or care  Follow up with your healthcare provider as directed: You may need to follow up with a cardiologist  Sandie Reynaga may need tests to check for heart problems that cause palpitations  Write down your questions so you remember to ask them during your visits  Treatment for heart palpitations  is usually not needed  Your healthcare provider may stop or change your medicines if they are causing your palpitations  Conditions that cause palpitations, such as an abnormal heartbeat, will be treated  Keep a record:  Write down when your palpitations start and stop, what you were doing when they started, and your symptoms  Keep track of what you ate or drank within a few hours of your palpitations  Include anything that seemed to help your symptoms, such as lying down or holding your breath  This record will help you and your healthcare provider learn what triggers your palpitations  Bring this record with you to your follow up visits  Help prevent heart palpitations:   · Manage stress and anxiety  Find ways to relax such as listening to music, meditating, or doing yoga  Exercise can also help decrease stress and anxiety  Talk to someone you trust about your stress or anxiety  You can also talk to a therapist      · Get plenty of sleep every night  Ask your healthcare provider how much sleep you need each night  · Do not drink caffeine or alcohol  Caffeine and alcohol can make your palpitations worse  Caffeine is found in soda, coffee, tea, chocolate, and drinks that increase your energy  · Do not smoke  Nicotine and other chemicals in cigarettes and cigars may damage your heart and blood vessels  Ask your healthcare provider for information if you currently smoke and need help to quit  E-cigarettes or smokeless tobacco still contain nicotine  Talk to your healthcare provider before you use these products  · Do not use illegal drugs  Talk to your healthcare provider if you use illegal drugs and want help to quit  © Copyright [address] 2021 Information is for End User's use only and may not be sold, redistributed or otherwise used for commercial purposes  All illustrations and images included in CareNotes® are the copyrighted property of [employer]  or Darlyn Sun  The above information is an  only  It is not intended as medical advice for individual conditions or treatments  Talk to your doctor, nurse or pharmacist before following any medical regimen to see if it is safe and effective for you

## 2022-01-04 NOTE — PROGRESS NOTES
Cardiology Consultation     Mirta Obregon  [id number]  1980  [facility] [place]  [address] [place], [place] [address] [facility] [place] [id number]    1  Primary hypertension  Echo complete w/ contrast if indicated    AMB extended holter monitor   2  Palpitations  Ambulatory referral to Cardiology    POCT ECG    Echo complete w/ contrast if indicated    AMB extended holter monitor     Chief Complaint   Patient presents with    New Patient Visit     establish cardiac care, Refer post ED visit for palpitations     Palpitations     woke up in night feeling heartbeat faster than normal      HPI: Patient is here for cardiac evaluation after ED visit for palpitations  He woke up with feeling like his heart was pounding and his BP at the time was 150's/90's  Episode lasted about 30 minutes  He had episodes 2 nights in a row, and that resulted in his ER visit  Did not wake him from sleep, but was awake and noted that his heart was pounding - not necessarily fast, apple watch said his pulse was in 80's  Has not happened since and has not happened before in life  No diaphoresis, nausea  No reported chest pain, shortness of breath, lightheadedness, syncope, LE edema, orthopnea, PND, or significant weight changes  Patient remains active without any increased fatigue out of the ordinary        Patient Active Problem List   Diagnosis    Carpal tunnel syndrome on right    Palpitations    Primary hypertension     Past Medical History:   Diagnosis Date    Asthma      Social History     Socioeconomic History    Marital status: /Civil Union     Spouse name: Not on file    Number of children: Not on file    Years of education: Not on file    Highest education level: Not on file   Occupational History    Not on file   Tobacco Use    Smoking status: Former Smoker    Smokeless tobacco: Never Used   Vaping Use    Vaping Use: Not on file   Substance and Sexual Activity    Alcohol use: Not Currently     Comment: rare    Drug use: No    Sexual activity: Not on file   Other Topics Concern    Not on file   Social History Narrative    Not on file     Social Determinants of Health     Financial Resource Strain: Not on file   Food Insecurity: Not on file   Transportation Needs: Not on file   Physical Activity: Not on file   Stress: Not on file   Social Connections: Not on file   Intimate Partner Violence: Not on file   Housing Stability: Not on file      History reviewed  No pertinent family history  No CAD in family that is known    Mother has PPM      Past Surgical History:   Procedure Laterality Date    [place] WRIST [place] LIG Right 10/12/2021    Procedure: Right endoscopic carpal tunnel release;  Surgeon: Loren Landrum MD;  [facility];  Service: Orthopedics    [place] WRIST [place] LIG Left 10/26/2021    Procedure: RELEASE CARPAL TUNNEL ENDOSCOPIC;  Surgeon: Loren Landrum MD;  [facility];  Service: Orthopedics       Current Outpatient Medications:     cetirizine (ZyrTEC) 10 mg tablet, Take 10 mg by mouth daily, Disp: , Rfl:     hydrochlorothiazide (HYDRODIURIL) 12 5 mg tablet, Take 12 5 mg by mouth daily, Disp: , Rfl:     multivitamin (THERAGRAN) TABS, Take 1 tablet by mouth daily, Disp: , Rfl:     acetaminophen (TYLENOL) 650 mg CR tablet, Take 1 tablet (650 mg total) by mouth every 8 (eight) hours as needed for mild pain (Patient not taking: Reported on 1/4/2022 ), Disp: 30 tablet, Rfl: 0    naproxen sodium (ALEVE) 220 MG tablet, Take 1 tablet (220 mg total) by mouth 2 (two) times a day with meals (Patient not taking: Reported on 1/4/2022 ), Disp: 20 tablet, Rfl: 0  Allergies   Allergen Reactions    Penicillins      Unknown, childhood allergy     Vitals:    01/04/22 0846   BP: 146/90   Pulse: (!) 112   SpO2: 99%   Weight: (!) 139 kg (306 lb)   Height: 6' (1 829 m) Labs:  Admission on 12/31/2021, Discharged on 12/31/2021   Component Date Value    WBC 12/31/2021 8 72     RBC 12/31/2021 5 70*    Hemoglobin 12/31/2021 15 9     Hematocrit 12/31/2021 47 6     MCV 12/31/2021 84     MCH 12/31/2021 27 9     MCHC 12/31/2021 33 4     RDW 12/31/2021 13 2     MPV 12/31/2021 9 4     Platelets 99/33/0102 327     nRBC 12/31/2021 0     Neutrophils Relative 12/31/2021 68     Immat GRANS % 12/31/2021 1     Lymphocytes Relative 12/31/2021 22     Monocytes Relative 12/31/2021 5     Eosinophils Relative 12/31/2021 3     Basophils Relative 12/31/2021 1     Neutrophils Absolute 12/31/2021 5 97     Immature Grans Absolute 12/31/2021 0 04     Lymphocytes Absolute 12/31/2021 1 95     Monocytes Absolute 12/31/2021 0 47     Eosinophils Absolute 12/31/2021 0 25     Basophils Absolute 12/31/2021 0 04     Sodium 12/31/2021 138     Potassium 12/31/2021 4 0     Chloride 12/31/2021 103     CO2 12/31/2021 26     ANION GAP 12/31/2021 9     BUN 12/31/2021 9     Creatinine 12/31/2021 0 99     Glucose 12/31/2021 91     Calcium 12/31/2021 9 1     eGFR 12/31/2021 94     Total Bilirubin 12/31/2021 0 42     Bilirubin, Direct 12/31/2021 0 08     Alkaline Phosphatase 12/31/2021 94     AST 12/31/2021 29     ALT 12/31/2021 63     Total Protein 12/31/2021 7 9     Albumin 12/31/2021 4 0     hs TnI 0hr 12/31/2021 2     SARS-CoV-2 12/31/2021 Negative     INFLUENZA A PCR 12/31/2021 Negative     INFLUENZA B PCR 12/31/2021 Negative     RSV PCR 12/31/2021 Negative     Ventricular Rate 12/31/2021 88     Atrial Rate 12/31/2021 94     TN Interval 12/31/2021 164     QRSD Interval 12/31/2021 86     QT Interval 12/31/2021 316     QTC Interval 12/31/2021 383     P Axis 12/31/2021 62     QRS Axis 12/31/2021 10     T Wave Axis 12/31/2021 50      Lab Results   Component Value Date    TRIG 360 (H) 11/07/2020    HDL 34 (L) 11/07/2020     Imaging: XR chest 2 views    Result Date: 12/31/2021  Narrative: CHEST INDICATION:   palpitations  COMPARISON:  Chest radiograph May 1, 2019 EXAM PERFORMED/VIEWS:  XR CHEST PA & LATERAL Images: 7 FINDINGS: Cardiomediastinal silhouette appears unremarkable  The lungs are clear  No pneumothorax or pleural effusion  Old healed left 7th rib fracture deformity  No acute osseous abnormality within the limitations of portable radiography  Impression: No acute cardiopulmonary disease  Workstation performed: [id number]       Review of Systems:  Review of Systems   Constitutional: Negative for activity change, appetite change, fatigue and fever  HENT: Negative for nosebleeds and sore throat  Eyes: Negative for photophobia and visual disturbance  Respiratory: Negative for cough, chest tightness, shortness of breath and wheezing  Cardiovascular: Positive for palpitations  Negative for chest pain and leg swelling  Gastrointestinal: Negative for abdominal pain, diarrhea, nausea and vomiting  Endocrine: Negative for polyuria  Genitourinary: Negative for dysuria, frequency and hematuria  Musculoskeletal: Negative for arthralgias, back pain and gait problem  Skin: Negative for pallor and rash  Neurological: Negative for dizziness, syncope, speech difficulty and light-headedness  Hematological: Does not bruise/bleed easily  Psychiatric/Behavioral: Negative for agitation, behavioral problems and confusion  Physical Exam:  Physical Exam  Vitals reviewed  Constitutional:       General: He is not in acute distress  Appearance: He is well-developed  He is not diaphoretic  HENT:      Head: Normocephalic and atraumatic  Nose: Nose normal    Eyes:      General: No scleral icterus  Pupils: Pupils are equal, round, and reactive to light  Neck:      Vascular: No JVD  Cardiovascular:      Rate and Rhythm: Normal rate and regular rhythm  Heart sounds: S1 normal and S2 normal  Heart sounds not distant  No murmur heard     No systolic murmur is present  No friction rub  No gallop  No S3 sounds  Pulmonary:      Effort: Pulmonary effort is normal  No respiratory distress  Breath sounds: Normal breath sounds  No wheezing or rales  Abdominal:      General: Bowel sounds are normal  There is no distension  Palpations: Abdomen is soft  Musculoskeletal:         General: No deformity  Cervical back: Normal range of motion and neck supple  Skin:     General: Skin is warm and dry  Findings: No erythema  Neurological:      Mental Status: He is alert and oriented to person, place, and time  Cranial Nerves: No cranial nerve deficit  Psychiatric:         Behavior: Behavior normal        Blood pressure 146/90, pulse (!) 112, height 6' (1 829 m), weight (!) 139 kg (306 lb), SpO2 99 %  EKG:  Sinus tachycardia  Otherwise normal ECG    Discussion/Summary:  Palpitations: unclear etiology  Will check an echo to rule out structural heart disease  Will also check a Zio monitor to rule out arrhythmias  This does not seem to be an atypical presentation of ischemic disease, so will not do stress testing at this time  HTN: elevated today, continued on low dose HCTZ - would recommend weight loss, but if that's not possible, may require increasing dose to 25mg

## 2022-02-01 ENCOUNTER — CLINICAL SUPPORT (OUTPATIENT)
Dept: CARDIOLOGY CLINIC | Facility: CLINIC | Age: 42
End: 2022-02-01
Payer: COMMERCIAL

## 2022-02-01 DIAGNOSIS — R00.2 PALPITATIONS: ICD-10-CM

## 2022-02-01 DIAGNOSIS — I10 PRIMARY HYPERTENSION: ICD-10-CM

## 2022-02-01 PROCEDURE — 93244 EXT ECG>48HR<7D REV&INTERPJ: CPT | Performed by: INTERNAL MEDICINE

## 2022-02-06 ENCOUNTER — APPOINTMENT (OUTPATIENT)
Dept: LAB | Age: 42
End: 2022-02-06
Payer: COMMERCIAL

## 2022-02-06 DIAGNOSIS — I10 ESSENTIAL HYPERTENSION, MALIGNANT: ICD-10-CM

## 2022-02-06 DIAGNOSIS — Z79.899 ENCOUNTER FOR LONG-TERM (CURRENT) USE OF OTHER MEDICATIONS: ICD-10-CM

## 2022-02-06 LAB
ANION GAP SERPL CALCULATED.3IONS-SCNC: 3 MMOL/L (ref 4–13)
BUN SERPL-MCNC: 12 MG/DL (ref 5–25)
CALCIUM SERPL-MCNC: 9.2 MG/DL (ref 8.3–10.1)
CHLORIDE SERPL-SCNC: 106 MMOL/L (ref 100–108)
CO2 SERPL-SCNC: 30 MMOL/L (ref 21–32)
CREAT SERPL-MCNC: 0.84 MG/DL (ref 0.6–1.3)
GFR SERPL CREATININE-BSD FRML MDRD: 108 ML/MIN/1.73SQ M
GLUCOSE P FAST SERPL-MCNC: 102 MG/DL (ref 65–99)
POTASSIUM SERPL-SCNC: 4 MMOL/L (ref 3.5–5.3)
SODIUM SERPL-SCNC: 139 MMOL/L (ref 136–145)

## 2022-02-06 PROCEDURE — 80048 BASIC METABOLIC PNL TOTAL CA: CPT

## 2022-02-06 PROCEDURE — 36415 COLL VENOUS BLD VENIPUNCTURE: CPT

## 2022-02-09 ENCOUNTER — CLINICAL SUPPORT (OUTPATIENT)
Dept: CARDIOLOGY CLINIC | Facility: CLINIC | Age: 42
End: 2022-02-09
Payer: COMMERCIAL

## 2022-02-09 DIAGNOSIS — R00.2 PALPITATIONS: Primary | ICD-10-CM

## 2022-02-09 PROCEDURE — 93244 EXT ECG>48HR<7D REV&INTERPJ: CPT | Performed by: INTERNAL MEDICINE

## 2022-02-18 ENCOUNTER — HOSPITAL ENCOUNTER (OUTPATIENT)
Dept: NON INVASIVE DIAGNOSTICS | Facility: CLINIC | Age: 42
Discharge: HOME | End: 2022-02-18
Payer: COMMERCIAL

## 2022-02-18 VITALS
SYSTOLIC BLOOD PRESSURE: 146 MMHG | HEIGHT: 72 IN | DIASTOLIC BLOOD PRESSURE: 90 MMHG | WEIGHT: 306 LBS | BODY MASS INDEX: 41.45 KG/M2 | HEART RATE: 91 BPM

## 2022-02-18 DIAGNOSIS — I10 PRIMARY HYPERTENSION: ICD-10-CM

## 2022-02-18 DIAGNOSIS — R00.2 PALPITATIONS: ICD-10-CM

## 2022-02-18 LAB
AORTIC ROOT: 3.6 CM
APICAL FOUR CHAMBER EJECTION FRACTION: 55 %
ASCENDING AORTA: 3 CM (ref 2.36–3.54)
E WAVE DECELERATION TIME: 280 MS
FRACTIONAL SHORTENING: 31 % (ref 28–44)
INTERVENTRICULAR SEPTUM IN DIASTOLE (PARASTERNAL SHORT AXIS VIEW): 0.9 CM (ref 0.6–1.13)
INTERVENTRICULAR SEPTUM: 0.9 CM (ref 0.6–1.1)
LAAS-AP2: 17.3 CM2
LAAS-AP4: 13.6 CM2
LEFT ATRIUM SIZE: 3.9 CM
LEFT INTERNAL DIMENSION IN SYSTOLE: 3.8 CM (ref 12.9–19.56)
LEFT VENTRICULAR INTERNAL DIMENSION IN DIASTOLE: 5.5 CM (ref 22.48–33.53)
LEFT VENTRICULAR POSTERIOR WALL IN END DIASTOLE: 0.9 CM (ref 0.59–1.11)
LEFT VENTRICULAR STROKE VOLUME: 90 ML
LVSV (TEICH): 90 ML
MV PEAK A VEL: 0.68 M/S
MV PEAK E VEL: 63 CM/S
MV STENOSIS PRESSURE HALF TIME: 81 MS
MV VALVE AREA P 1/2 METHOD: 2.72 CM2
RIGHT ATRIUM AREA SYSTOLE A4C: 14.8 CM2
RIGHT VENTRICLE ID DIMENSION: 4.2 CM
SL CV LEFT ATRIUM LENGTH A2C: 5.1 CM
SL CV PED ECHO LEFT VENTRICLE DIASTOLIC VOLUME (MOD BIPLANE) 2D: 150 ML
SL CV PED ECHO LEFT VENTRICLE SYSTOLIC VOLUME (MOD BIPLANE) 2D: 60 ML
TR MAX PG: 3 MMHG
TR PEAK VELOCITY: 0.9 M/S
TRICUSPID VALVE PEAK REGURGITATION VELOCITY: 0.92 M/S
Z-SCORE OF ASCENDING AORTA: 0.16
Z-SCORE OF INTERVENTRICULAR SEPTUM IN END DIASTOLE: 0.26
Z-SCORE OF LEFT VENTRICULAR DIMENSION IN END DIASTOLE: -16.08
Z-SCORE OF LEFT VENTRICULAR DIMENSION IN END SYSTOLE: -11.31
Z-SCORE OF LEFT VENTRICULAR POSTERIOR WALL IN END DIASTOLE: 0.37

## 2022-02-18 PROCEDURE — 93306 TTE W/DOPPLER COMPLETE: CPT | Performed by: INTERNAL MEDICINE

## 2022-02-18 PROCEDURE — 93306 TTE W/DOPPLER COMPLETE: CPT

## 2022-02-20 ENCOUNTER — OFFICE VISIT (OUTPATIENT)
Dept: URGENT CARE | Age: 42
End: 2022-02-20
Payer: COMMERCIAL

## 2022-02-20 VITALS
OXYGEN SATURATION: 98 % | TEMPERATURE: 97.9 F | SYSTOLIC BLOOD PRESSURE: 124 MMHG | DIASTOLIC BLOOD PRESSURE: 85 MMHG | HEART RATE: 101 BPM | RESPIRATION RATE: 18 BRPM

## 2022-02-20 DIAGNOSIS — R10.84 GENERALIZED ABDOMINAL PAIN: Primary | ICD-10-CM

## 2022-02-20 DIAGNOSIS — R50.9 FEVER, UNSPECIFIED FEVER CAUSE: ICD-10-CM

## 2022-02-20 DIAGNOSIS — R82.90 URINE ABNORMALITY: ICD-10-CM

## 2022-02-20 DIAGNOSIS — R35.0 URINARY FREQUENCY: ICD-10-CM

## 2022-02-20 LAB
SL AMB  POCT GLUCOSE, UA: NEGATIVE
SL AMB LEUKOCYTE ESTERASE,UA: NEGATIVE
SL AMB POCT BILIRUBIN,UA: NEGATIVE
SL AMB POCT BLOOD,UA: NORMAL
SL AMB POCT CLARITY,UA: CLEAR
SL AMB POCT COLOR,UA: YELLOW
SL AMB POCT KETONES,UA: NEGATIVE
SL AMB POCT NITRITE,UA: NEGATIVE
SL AMB POCT PH,UA: 7.5
SL AMB POCT SPECIFIC GRAVITY,UA: 1
SL AMB POCT URINE PROTEIN: NEGATIVE
SL AMB POCT UROBILINOGEN: NEGATIVE

## 2022-02-20 PROCEDURE — S9083 URGENT CARE CENTER GLOBAL: HCPCS

## 2022-02-20 PROCEDURE — 87086 URINE CULTURE/COLONY COUNT: CPT

## 2022-02-20 PROCEDURE — G0382 LEV 3 HOSP TYPE B ED VISIT: HCPCS

## 2022-02-20 PROCEDURE — 81002 URINALYSIS NONAUTO W/O SCOPE: CPT

## 2022-02-20 NOTE — PATIENT INSTRUCTIONS
Abdominal Pain   AMBULATORY CARE:   Abdominal pain  can be dull, achy, or sharp  You may have pain in one area of your abdomen, or in your entire abdomen  Your pain may be caused by a condition such as constipation, food sensitivity or poisoning, infection, or a blockage  Abdominal pain can also be from a hernia, appendicitis, or an ulcer  Liver, gallbladder, or kidney conditions can also cause abdominal pain  The cause of your abdominal pain may not be known  Call your local emergency number (911 in the [address] [facility]) if:   · You have new chest pain or shortness of breath  Seek care immediately if:   · You have pulsing pain in your upper abdomen or lower back that suddenly becomes constant  · Your pain is in the right lower abdominal area and worsens with movement  · You have a fever over 100 4°F (38°C) or shaking chills  · You are vomiting and cannot keep food or liquids down  · Your pain does not improve or gets worse over the next 8 to 12 hours  · You see blood in your vomit or bowel movements, or they look black and tarry  · Your skin or the whites of your eyes turn yellow  · You are a woman and have a large amount of vaginal bleeding that is not your monthly period  Call your doctor if:   · You have pain in your lower back  · You are a man and have pain in your testicles  · You have pain when you urinate  · You have questions or concerns about your condition or care  Treatment for abdominal pain  may include any of the following:  · Prescription pain medicine  may be given  Ask your healthcare provider how to take this medicine safely  Some prescription pain medicines contain acetaminophen  Do not take other medicines that contain acetaminophen without talking to your healthcare provider  Too much acetaminophen may cause liver damage  Prescription pain medicine may cause constipation  Ask your healthcare provider how to prevent or treat constipation       · Medicines  may be given to calm your stomach or prevent vomiting  · Relaxation therapy  may be used along with pain medicine  · Surgery  may be needed, depending on the cause  Manage your symptoms:   · Apply heat  on your abdomen for 20 to 30 minutes every 2 hours for as many days as directed  Heat helps decrease pain and muscle spasms  · Make changes to the food you eat, if needed  Do not eat foods that cause abdominal pain or other symptoms  Eat small meals more often  The following changes may also help:    ? Eat more high-fiber foods if you are constipated  High-fiber foods include fruits, vegetables, whole-grain foods, and legumes  ? Do not eat foods that cause gas if you have bloating  Examples include broccoli, cabbage, and cauliflower  Do not drink soda or carbonated drinks  These may also cause gas  ? Do not eat foods or drinks that contain sorbitol or fructose if you have diarrhea and bloating  Some examples are fruit juices, candy, jelly, and sugar-free gum  ? Do not eat high-fat foods  Examples include fried foods, cheeseburgers, hot dogs, and desserts  ? Limit or do not have caffeine  Caffeine may make symptoms such as heartburn or nausea worse  ? Drink more liquids to prevent dehydration from diarrhea or vomiting  Ask your healthcare provider how much liquid to drink each day and which liquids are best for you  · Keep a diary of your abdominal pain  A diary may help your healthcare provider learn what is causing your abdominal pain  Include when the pain happens, how long it lasts, and what the pain feels like  Write down any other symptoms you have with abdominal pain  Also write down what you eat, and what symptoms you have after you eat  · Manage your stress  Stress may cause abdominal pain  Your healthcare provider may recommend relaxation techniques and deep breathing exercises to help decrease your stress   Your healthcare provider may recommend you talk to someone about your stress or anxiety, such as a counselor or a trusted friend  Get plenty of sleep and exercise regularly  · Limit or do not drink alcohol  Alcohol can make your abdominal pain worse  Ask your healthcare provider if it is safe for you to drink alcohol  Also ask how much is safe for you to drink  · Do not smoke  Nicotine and other chemicals in cigarettes can damage your esophagus and stomach  Ask your healthcare provider for information if you currently smoke and need help to quit  E-cigarettes or smokeless tobacco still contain nicotine  Talk to your healthcare provider before you use these products  Follow up with your doctor within 24 hours or as directed:  Write down your questions so you remember to ask them during your visits  © [employer] 2021 Information is for End User's use only and may not be sold, redistributed or otherwise used for commercial purposes  All illustrations and images included in CareNotes® are the copyrighted property of [employer]  or [facility] Kartik Salgado   The above information is an  only  It is not intended as medical advice for individual conditions or treatments  Talk to your doctor, nurse or pharmacist before following any medical regimen to see if it is safe and effective for you

## 2022-02-21 LAB — BACTERIA UR CULT: NORMAL

## 2022-02-24 ENCOUNTER — OFFICE VISIT (OUTPATIENT)
Dept: CARDIOLOGY CLINIC | Facility: CLINIC | Age: 42
End: 2022-02-24
Payer: COMMERCIAL

## 2022-02-24 VITALS
DIASTOLIC BLOOD PRESSURE: 74 MMHG | OXYGEN SATURATION: 98 % | HEART RATE: 100 BPM | BODY MASS INDEX: 42.31 KG/M2 | HEIGHT: 72 IN | SYSTOLIC BLOOD PRESSURE: 126 MMHG | WEIGHT: 312.4 LBS

## 2022-02-24 DIAGNOSIS — R00.2 PALPITATIONS: Primary | ICD-10-CM

## 2022-02-24 DIAGNOSIS — I10 PRIMARY HYPERTENSION: ICD-10-CM

## 2022-02-24 PROCEDURE — 99214 OFFICE O/P EST MOD 30 MIN: CPT | Performed by: INTERNAL MEDICINE

## 2022-02-24 RX ORDER — DIAZEPAM 5 MG/1
TABLET ORAL
COMMUNITY
Start: 2022-02-14

## 2022-02-24 RX ORDER — LEVOFLOXACIN 500 MG/1
500 TABLET, FILM COATED ORAL DAILY
COMMUNITY
Start: 2022-02-23

## 2022-02-24 NOTE — PATIENT INSTRUCTIONS
Chronic Hypertension   AMBULATORY CARE:   Hypertension  is high blood pressure  Your blood pressure is the force of your blood moving against the walls of your arteries  Hypertension causes your blood pressure to get so high that your heart has to work much harder than normal  This can damage your heart  Even if you have hypertension for years, lifestyle changes, medicines, or both can help bring your blood pressure to normal   Call your local emergency number (911 in the [address] [facility]) or have someone call if:   · You have chest pain  · You have any of the following signs of a heart attack:      ? Squeezing, pressure, or pain in your chest    ? You may  also have any of the following:     § Discomfort or pain in your back, neck, jaw, stomach, or arm    § Shortness of breath    § Nausea or vomiting    § Lightheadedness or a sudden cold sweat    · You become confused or have difficulty speaking  · You suddenly feel lightheaded or have trouble breathing  Seek care immediately if:   · You have a severe headache or vision loss  · You have weakness in an arm or leg  Call your doctor or cardiologist if:   · You feel faint, dizzy, confused, or drowsy  · You have been taking your blood pressure medicine but your pressure is higher than your provider says it should be  · You have questions or concerns about your condition or care  Treatment for chronic hypertension  may include medicine to lower your blood pressure and cholesterol levels  A low cholesterol level helps prevent heart disease and makes it easier to control your blood pressure  Heart disease can make your blood pressure harder to control  You may also need to make lifestyle changes  What you need to know about the stages of hypertension:       · Normal blood pressure is 119/79 or lower   Your healthcare provider may only check your blood pressure each year if it stays at a normal level  · Elevated blood pressure is 120/79 to 129/79    This is sometimes called prehypertension  Your healthcare provider may suggest lifestyle changes to help lower your blood pressure to a normal level  He or she may then check it again in 3 to 6 months  · Stage 1 hypertension is 130/80  to 139/89   Your provider may recommend lifestyle changes, medication, and checks every 3 to 6 months until your blood pressure is controlled  · Stage 2 hypertension is 140/90 or higher   Your provider will recommend lifestyle changes and have you take 2 kinds of hypertension medicines  You will also need to have your blood pressure checked monthly until it is controlled  Manage chronic hypertension:   · Check your blood pressure at home  Avoid smoking, caffeine, and exercise at least 30 minutes before checking your blood pressure  Sit and rest for 5 minutes before you take your blood pressure  Extend your arm and support it on a flat surface  Your arm should be at the same level as your heart  Follow the directions that came with your blood pressure monitor  Check your blood pressure 2 times, 1 minute apart, before you take your medicine in the morning  Also check your blood pressure before your evening meal  Keep a record of your readings and bring it to your follow-up visits  Ask your healthcare provider what your blood pressure should be  · Manage any other health conditions you have  Health conditions such as diabetes can increase your risk for hypertension  Follow your healthcare provider's instructions and take all your medicines as directed  Talk to your healthcare provider about any new health conditions you have recently developed  · Ask about all medicines  Certain medicines can increase your blood pressure  Examples include oral birth control pills, decongestants, herbal supplements, and NSAIDs, such as ibuprofen  Your healthcare provider can tell you which medicines are safe for you to take   This includes prescription and over-the-counter medicines  Lifestyle changes you can make to lower your blood pressure: Your provider may want you to make more lifestyle changes if you are having trouble controlling your blood pressure  This may feel difficult over time, especially if you think you are making good changes but your pressure is still high  It might help to focus on one new change at a time  For example, try to add 1 more day of exercise, or exercise for an extra 10 minutes on 2 days  Small changes can make a big difference  Your healthcare provider can also refer you to specialists such as a dietitian who can help you make small changes  Your family members may be included in helping you learn to create lifestyle changes, such as the following:     · Limit sodium (salt) as directed  Too much sodium can affect your fluid balance  Check labels to find low-sodium or no-salt-added foods  Some low-sodium foods use potassium salts for flavor  Too much potassium can also cause health problems  Your healthcare provider will tell you how much sodium and potassium are safe for you to have in a day  He or she may recommend that you limit sodium to 2,300 mg a day  · Follow the meal plan recommended by your healthcare provider  A dietitian or your provider can give you more information on low-sodium plans or the DASH (Dietary Approaches to Stop Hypertension) eating plan  The DASH plan is low in sodium, processed sugar, unhealthy fats, and total fat  It is high in potassium, calcium, and fiber  These can be found in vegetables, fruit, and whole-grain foods  · Be physically active throughout the day  Physical activity, such as exercise, can help control your blood pressure and your weight  Be physically active for at least 30 minutes per day, on most days of the week  Include aerobic activity, such as walking or riding a bicycle  Also include strength training at least 2 times each week   Your healthcare providers can help you create a physical activity plan  · Decrease stress  This may help lower your blood pressure  Learn ways to relax, such as deep breathing or listening to music  · Limit alcohol as directed  Alcohol can increase your blood pressure  A drink of alcohol is 12 ounces of beer, 5 ounces of wine, or 1½ ounces of liquor  · Do not smoke  Nicotine and other chemicals in cigarettes and cigars can increase your blood pressure and also cause lung damage  Ask your healthcare provider for information if you currently smoke and need help to quit  E-cigarettes or smokeless tobacco still contain nicotine  Talk to your healthcare provider before you use these products  Follow up with your doctor or cardiologist as directed: You will need to return to have your blood pressure checked and to have other lab tests done  Write down your questions so you remember to ask them during your visits  © Copyright [employer] 2021 Information is for End User's use only and may not be sold, redistributed or otherwise used for commercial purposes  All illustrations and images included in CareNotes® are the copyrighted property of [employer]  or [facility] Kartik Salgado   The above information is an  only  It is not intended as medical advice for individual conditions or treatments  Talk to your doctor, nurse or pharmacist before following any medical regimen to see if it is safe and effective for you

## 2022-02-24 NOTE — PROGRESS NOTES
Cardiology Consultation     Honey Fleischer  [id number]  1980  [facility] [place]  [address] [place], [place] [address] [facility] [id number]    1  Palpitations     2  Primary hypertension       Chief Complaint   Patient presents with    Follow-up     4 week no cardiac complaints      HPI: Patient is here for cardiac evaluation after ED visit for palpitations  He woke up with feeling like his heart was pounding and his BP at the time was 150's/90's  Episode lasted about 30 minutes  He had episodes 2 nights in a row, and that resulted in his ER visit  Did not wake him from sleep, but was awake and noted that his heart was pounding - not necessarily fast, apple watch said his pulse was in 80's  Has not happened since and has not happened before in life - currently symptom free  No diaphoresis, nausea  Patient feels well, without complaints  No reported chest pain, shortness of breath, palpitations, lightheadedness, syncope, LE edema, orthopnea, PND, or significant weight changes  Patient remains active without any increased fatigue out of the ordinary          Patient Active Problem List   Diagnosis    Carpal tunnel syndrome on right    Palpitations    Primary hypertension     Past Medical History:   Diagnosis Date    Asthma      Social History     Socioeconomic History    Marital status: /Civil Union     Spouse name: Not on file    Number of children: Not on file    Years of education: Not on file    Highest education level: Not on file   Occupational History    Not on file   Tobacco Use    Smoking status: Former Smoker    Smokeless tobacco: Never Used   Vaping Use    Vaping Use: Not on file   Substance and Sexual Activity    Alcohol use: Not Currently     Comment: rare    Drug use: No    Sexual activity: Not on file   Other Topics Concern    Not on file   Social History Narrative    Not on file Social Determinants of Health     Financial Resource Strain: Not on file   Food Insecurity: Not on file   Transportation Needs: Not on file   Physical Activity: Not on file   Stress: Not on file   Social Connections: Not on file   Intimate Partner Violence: Not on file   Housing Stability: Not on file      History reviewed  No pertinent family history  No CAD in family that is known    Mother has PPM      Past Surgical History:   Procedure Laterality Date    MI WRIST Evette Endow LIG Right 10/12/2021    Procedure: Right endoscopic carpal tunnel release;  Surgeon: Magan Barrios MD;  [facility];  Service: Orthopedics    MI WRIST Evette Endow LIG Left 10/26/2021    Procedure: RELEASE CARPAL TUNNEL ENDOSCOPIC;  Surgeon: Magan Barrios MD;  [facility];  Service: Orthopedics       Current Outpatient Medications:     cetirizine (ZyrTEC) 10 mg tablet, Take 10 mg by mouth daily, Disp: , Rfl:     diazepam (VALIUM) 5 mg tablet, TAKE 1 TABLET BY MOUTH TWO TIMES DAILY AS NEEDED FOR MUSCLE SPASMS, Disp: , Rfl:     hydrochlorothiazide (HYDRODIURIL) 12 5 mg tablet, Take 12 5 mg by mouth daily, Disp: , Rfl:     levofloxacin (LEVAQUIN) 500 mg tablet, Take 500 mg by mouth daily, Disp: , Rfl:     multivitamin (THERAGRAN) TABS, Take 1 tablet by mouth daily, Disp: , Rfl:   Allergies   Allergen Reactions    Penicillins      Unknown, childhood allergy     Vitals:    02/24/22 0850   BP: 126/74   BP Location: Left arm   Patient Position: Sitting   Cuff Size: Large   Pulse: 100   SpO2: 98%   Weight: (!) 142 kg (312 lb 6 4 oz)   Height: 6' (1 829 m)       Labs:  Office Visit on 02/20/2022   Component Date Value    LEUKOCYTE ESTERASE,UA 02/20/2022 NEGATIVE     NITRITE,UA 02/20/2022 NEGATIVE     SL AMB POCT UROBILINOGEN 02/20/2022 NEGATIVE     POCT URINE PROTEIN 02/20/2022 NEGATIVE      PH,UA 02/20/2022 7 5     BLOOD,UA 02/20/2022 TRACE     SPECIFIC GRAVITY,UA 02/20/2022 1 005     KETONES,UA 02/20/2022 NEGATIVE     BILIRUBIN,UA 02/20/2022 NEGATIVE     GLUCOSE, UA 02/20/2022 NEGATIVE      COLOR,UA 02/20/2022 YELLOW     CLARITY,UA 02/20/2022 CLEAR     Urine Culture 02/20/2022 <10,000 cfu/ml     Hospital Outpatient Visit on 02/18/2022   Component Date Value    LA size 02/18/2022 3 9     Triscuspid Valve Regurgi* 02/18/2022 3 0     Tricuspid valve peak reg* 02/18/2022 0 92     LVPWd 02/18/2022 0 90     Left Atrium Area-systoli* 02/18/2022 17 3     Left Atrium Area-systoli* 02/18/2022 13 6     TR Peak Shailesh 02/18/2022 0 9     IVSd 02/18/2022 0 26     LV DIASTOLIC VOLUME (MOD* 00/46/6540 150     LEFT VENTRICLE SYSTOLIC * 71/48/7546 60     Left ventricular stroke * 02/18/2022 90 00     A4C EF 02/18/2022 55     LA length (A2C) 02/18/2022 5 10     LVIDd 02/18/2022 5 50     IVS 02/18/2022 0 9     LVIDS 02/18/2022 3 80     FS 02/18/2022 31     Asc Ao 02/18/2022 3     Ao root 02/18/2022 3 60     RVID d 02/18/2022 4 2     MV valve area p 1/2 meth* 02/18/2022 2 72     E wave deceleration time 02/18/2022 280     MV Peak E Shailesh 02/18/2022 63     MV Peak A Shailesh 02/18/2022 0 68     RAA A4C 02/18/2022 14 8     MV stenosis pressure 1/2* 02/18/2022 81     Left Ventricular Stroke * 02/18/2022 90     Ao asc z-score 02/18/2022 0 16     ZLVPWD 02/18/2022 0 37     ZLVIDS 02/18/2022 -11 31     ZLVIDD 02/18/2022 -16 08     ZIVSD 02/18/2022 0 26    Appointment on 02/06/2022   Component Date Value    Sodium 02/06/2022 139     Potassium 02/06/2022 4 0     Chloride 02/06/2022 106     CO2 02/06/2022 30     ANION GAP 02/06/2022 3*    BUN 02/06/2022 12     Creatinine 02/06/2022 0 84     Glucose, Fasting 02/06/2022 102*    Calcium 02/06/2022 9 2     eGFR 02/06/2022 108    Admission on 12/31/2021, Discharged on 12/31/2021   Component Date Value    WBC 12/31/2021 8 72     RBC 12/31/2021 5 70*    Hemoglobin 12/31/2021 15 9     Hematocrit 12/31/2021 47 6     MCV 12/31/2021 [address] 12/31/2021 27 9     MCHC 12/31/2021 33 4     RDW 12/31/2021 13 2     MPV 12/31/2021 9 4     Platelets 05/42/3354 327     nRBC 12/31/2021 0     Neutrophils Relative 12/31/2021 68     Immat GRANS % 12/31/2021 1     Lymphocytes Relative 12/31/2021 22     Monocytes Relative 12/31/2021 5     Eosinophils Relative 12/31/2021 3     Basophils Relative 12/31/2021 1     Neutrophils Absolute 12/31/2021 5 97     Immature Grans Absolute 12/31/2021 0 04     Lymphocytes Absolute 12/31/2021 1 95     Monocytes Absolute 12/31/2021 0 47     Eosinophils Absolute 12/31/2021 0 25     Basophils Absolute 12/31/2021 0 04     Sodium 12/31/2021 138     Potassium 12/31/2021 4 0     Chloride 12/31/2021 103     CO2 12/31/2021 26     ANION GAP 12/31/2021 9     BUN 12/31/2021 9     Creatinine 12/31/2021 0 99     Glucose 12/31/2021 91     Calcium 12/31/2021 9 1     eGFR 12/31/2021 94     Total Bilirubin 12/31/2021 0 42     Bilirubin, Direct 12/31/2021 0 08     Alkaline Phosphatase 12/31/2021 94     AST 12/31/2021 29     ALT 12/31/2021 63     Total Protein 12/31/2021 7 9     Albumin 12/31/2021 4 0     hs TnI 0hr 12/31/2021 2     SARS-CoV-2 12/31/2021 Negative     INFLUENZA A PCR 12/31/2021 Negative     INFLUENZA B PCR 12/31/2021 Negative     RSV PCR 12/31/2021 Negative     Ventricular Rate 12/31/2021 88     Atrial Rate 12/31/2021 94     VA Interval 12/31/2021 164     QRSD Interval 12/31/2021 86     QT Interval 12/31/2021 316     QTC Interval 12/31/2021 383     P Axis 12/31/2021 62     QRS Axis 12/31/2021 10     T Wave Axis 12/31/2021 50      Lab Results   Component Value Date    TRIG 360 (H) 11/07/2020    HDL 34 (L) 11/07/2020     Imaging: XR chest 2 views    Result Date: 12/31/2021  Narrative: CHEST INDICATION:   palpitations  COMPARISON:  Chest radiograph May 1, 2019 EXAM PERFORMED/VIEWS:  XR CHEST PA & LATERAL Images: 7 FINDINGS: Cardiomediastinal silhouette appears unremarkable   The lungs are clear  No pneumothorax or pleural effusion  Old healed left 7th rib fracture deformity  No acute osseous abnormality within the limitations of portable radiography  Impression: No acute cardiopulmonary disease  Workstation performed: [id number]       Review of Systems:  Review of Systems   Constitutional: Negative for activity change, appetite change, fatigue and fever  HENT: Negative for nosebleeds and sore throat  Eyes: Negative for photophobia and visual disturbance  Respiratory: Negative for cough, chest tightness, shortness of breath and wheezing  Cardiovascular: Negative for chest pain, palpitations and leg swelling  Gastrointestinal: Negative for abdominal pain, diarrhea, nausea and vomiting  Endocrine: Negative for polyuria  Genitourinary: Negative for dysuria, frequency and hematuria  Musculoskeletal: Negative for arthralgias, back pain and gait problem  Skin: Negative for pallor and rash  Neurological: Negative for dizziness, syncope, speech difficulty and light-headedness  Hematological: Does not bruise/bleed easily  Psychiatric/Behavioral: Negative for agitation, behavioral problems and confusion  Physical Exam:  Physical Exam  Vitals reviewed  Constitutional:       General: He is not in acute distress  Appearance: He is well-developed  He is not diaphoretic  HENT:      Head: Normocephalic and atraumatic  Nose: Nose normal    Eyes:      General: No scleral icterus  Pupils: Pupils are equal, round, and reactive to light  Neck:      Vascular: No JVD  Cardiovascular:      Rate and Rhythm: Normal rate and regular rhythm  Heart sounds: S1 normal and S2 normal  Heart sounds not distant  No murmur heard  No systolic murmur is present  No friction rub  No gallop  No S3 sounds  Pulmonary:      Effort: Pulmonary effort is normal  No respiratory distress  Breath sounds: Normal breath sounds  No wheezing or rales     Abdominal: General: Bowel sounds are normal  There is no distension  Palpations: Abdomen is soft  Musculoskeletal:         General: No deformity  Cervical back: Normal range of motion and neck supple  Skin:     General: Skin is warm and dry  Findings: No erythema  Neurological:      Mental Status: He is alert and oriented to person, place, and time  Cranial Nerves: No cranial nerve deficit  Psychiatric:         Behavior: Behavior normal        Blood pressure 126/74, pulse 100, height 6' (1 829 m), weight (!) 142 kg (312 lb 6 4 oz), SpO2 98 %  EKG:  Sinus tachycardia  Otherwise normal ECG    Discussion/Summary:  Palpitations: unclear etiology  We checked an echo that ruled out structural heart disease  We also checked a Zio monitor that ruled out arrhythmias - SR noted with symptoms  This does not seem to be an atypical presentation of ischemic disease, so will not do stress testing at this time  Symptoms are not overly bothersome to him now  HTN: much improved today, continued on low dose HCTZ

## 2022-03-16 ENCOUNTER — HOSPITAL ENCOUNTER (OUTPATIENT)
Dept: RADIOLOGY | Facility: HOSPITAL | Age: 42
Discharge: HOME | End: 2022-03-16

## 2022-03-16 ENCOUNTER — OFFICE VISIT (OUTPATIENT)
Dept: URGENT CARE | Age: 42
End: 2022-03-16
Payer: COMMERCIAL

## 2022-03-16 ENCOUNTER — APPOINTMENT (OUTPATIENT)
Dept: RADIOLOGY | Age: 42
End: 2022-03-16
Payer: COMMERCIAL

## 2022-03-16 VITALS
OXYGEN SATURATION: 97 % | HEART RATE: 96 BPM | SYSTOLIC BLOOD PRESSURE: 134 MMHG | DIASTOLIC BLOOD PRESSURE: 76 MMHG | TEMPERATURE: 97.7 F | RESPIRATION RATE: 16 BRPM

## 2022-03-16 DIAGNOSIS — S93.602A SPRAIN OF LEFT FOOT, INITIAL ENCOUNTER: Primary | ICD-10-CM

## 2022-03-16 DIAGNOSIS — M79.672 LEFT FOOT PAIN: ICD-10-CM

## 2022-03-16 PROCEDURE — 73630 X-RAY EXAM OF FOOT: CPT

## 2022-03-16 PROCEDURE — S9083 URGENT CARE CENTER GLOBAL: HCPCS

## 2022-03-16 PROCEDURE — G0382 LEV 3 HOSP TYPE B ED VISIT: HCPCS

## 2022-03-16 NOTE — LETTER
March 16, 2022     Patient: Frann Aase   YOB: 1980   Date of Visit: 3/16/2022       To Whom it May Concern:    Jose Fabian was seen in my clinic on 3/16/2022  He may return to work on 3/17/2022  If you have any questions or concerns, please don't hesitate to call           Sincerely,          LISHA Gomez        CC: No Recipients

## 2022-03-16 NOTE — PROGRESS NOTES
[address] [facility]        NAME: Honey Fleischer is a 43 y o  male  : 1980    MRN: [id number]  DATE: 2022  TIME: 9:22 AM    Assessment and Plan   Sprain of left foot, initial encounter [M51 263J]  1  Sprain of left foot, initial encounter     2  Left foot pain  XR foot 3+ vw left     Left foot x-ray reviewed  No acute osseous abnormality noted at this time  Awaiting final read per radiology department  Patient Instructions     Rice therapy as discussed  Ibuprofen or Tylenol as needed for pain  Follow up with PCP in 3-5 days  Proceed to  ER if symptoms worsen  Chief Complaint     Chief Complaint   Patient presents with    Foot Pain         History of Present Illness       Patient presenting for evaluation of left foot pain  He states that he developed foot pain on the of his left foot 2 days ago  He denies any trauma or injury to the affected area  He states that he helped his son at baseball practice and shovels snow, but denied any direct injury  He describes the pain as a sharp pain  Patient states that pain is exacerbated with applying pressure to his left lower extremity  Patient states that he has been applying ice to the affected area, and has not been providing relief  He denies any other treatment for his symptoms  He denies a history of left foot injury  He denies any numbness or tingling to the affected area  Review of Systems   Review of Systems   Constitutional: Negative for chills and fever  HENT: Negative for ear pain and sore throat  Eyes: Negative for pain and visual disturbance  Respiratory: Negative for cough and shortness of breath  Cardiovascular: Negative for chest pain and palpitations  Gastrointestinal: Negative for abdominal pain and vomiting  Genitourinary: Negative for dysuria and hematuria  Musculoskeletal: Positive for arthralgias and gait problem  Negative for back pain  Skin: Negative for color change and rash  Neurological: Negative for syncope and headaches  All other systems reviewed and are negative  Current Medications       Current Outpatient Medications:     cetirizine (ZyrTEC) 10 mg tablet, Take 10 mg by mouth daily, Disp: , Rfl:     diazepam (VALIUM) 5 mg tablet, TAKE 1 TABLET BY MOUTH TWO TIMES DAILY AS NEEDED FOR MUSCLE SPASMS, Disp: , Rfl:     hydrochlorothiazide (HYDRODIURIL) 12 5 mg tablet, Take 12 5 mg by mouth daily, Disp: , Rfl:     levofloxacin (LEVAQUIN) 500 mg tablet, Take 500 mg by mouth daily, Disp: , Rfl:     multivitamin (THERAGRAN) TABS, Take 1 tablet by mouth daily, Disp: , Rfl:     Current Allergies     Allergies as of 03/16/2022 - Reviewed 03/16/2022   Allergen Reaction Noted    Penicillins  02/08/2014            The following portions of the patient's history were reviewed and updated as appropriate: allergies, current medications, past family history, past medical history, past social history, past surgical history and problem list      Past Medical History:   Diagnosis Date    Asthma        Past Surgical History:   Procedure Laterality Date    RI WRIST Mace Hesselbach LIG Right 10/12/2021    Procedure: Right endoscopic carpal tunnel release;  Surgeon: Minoo Rivera MD;  [facility];  Service: Orthopedics    RI WRIST Mace Hesselbach LIG Left 10/26/2021    Procedure: RELEASE CARPAL TUNNEL ENDOSCOPIC;  Surgeon: Minoo Rivera MD;  [facility];  Service: Orthopedics       History reviewed  No pertinent family history  Medications have been verified  Objective   /76   Pulse 96   Temp 97 7 °F (36 5 °C)   Resp 16   SpO2 97%        Physical Exam     Physical Exam  Vitals and nursing note reviewed  Constitutional:       General: He is not in acute distress  Appearance: Normal appearance  He is not ill-appearing  HENT:      Head: Normocephalic and atraumatic        Right Ear: Tympanic membrane normal  Left Ear: Tympanic membrane normal       Nose: Nose normal  No congestion or rhinorrhea  Mouth/Throat:      Mouth: Mucous membranes are moist       Pharynx: Oropharynx is clear  No oropharyngeal exudate or posterior oropharyngeal erythema  Eyes:      Extraocular Movements: Extraocular movements intact  Conjunctiva/sclera: Conjunctivae normal       Pupils: Pupils are equal, round, and reactive to light  Cardiovascular:      Rate and Rhythm: Normal rate and regular rhythm  Pulses: Normal pulses  Heart sounds: Normal heart sounds  No murmur heard  No friction rub  No gallop  Pulmonary:      Effort: Pulmonary effort is normal  No respiratory distress  Breath sounds: Normal breath sounds  No stridor  No wheezing, rhonchi or rales  Abdominal:      General: Bowel sounds are normal       Palpations: Abdomen is soft  Tenderness: There is no abdominal tenderness  Musculoskeletal:         General: Tenderness present  Normal range of motion  Cervical back: Normal range of motion and neck supple  Left foot: Normal range of motion and normal capillary refill  Tenderness (Mild tenderness) present  No swelling, deformity or laceration  Normal pulse  Legs:    Skin:     General: Skin is warm and dry  Capillary Refill: Capillary refill takes less than 2 seconds  Neurological:      General: No focal deficit present  Mental Status: He is alert and oriented to person, place, and time     Psychiatric:         Mood and Affect: Mood normal          Behavior: Behavior normal

## 2022-09-19 ENCOUNTER — APPOINTMENT (OUTPATIENT)
Dept: LAB | Age: 42
End: 2022-09-19
Payer: COMMERCIAL

## 2022-09-19 DIAGNOSIS — I10 ESSENTIAL HYPERTENSION, MALIGNANT: ICD-10-CM

## 2022-09-19 DIAGNOSIS — N41.0 ACUTE PROSTATITIS: ICD-10-CM

## 2022-09-19 DIAGNOSIS — E78.49 OTHER HYPERLIPIDEMIA: ICD-10-CM

## 2022-09-19 LAB
ALBUMIN SERPL BCP-MCNC: 3.3 G/DL (ref 3.5–5)
ALP SERPL-CCNC: 97 U/L (ref 46–116)
ALT SERPL W P-5'-P-CCNC: 51 U/L (ref 12–78)
ANION GAP SERPL CALCULATED.3IONS-SCNC: 5 MMOL/L (ref 4–13)
AST SERPL W P-5'-P-CCNC: 26 U/L (ref 5–45)
BILIRUB SERPL-MCNC: 0.55 MG/DL (ref 0.2–1)
BUN SERPL-MCNC: 11 MG/DL (ref 5–25)
CALCIUM ALBUM COR SERPL-MCNC: 9.3 MG/DL (ref 8.3–10.1)
CALCIUM SERPL-MCNC: 8.7 MG/DL (ref 8.3–10.1)
CHLORIDE SERPL-SCNC: 106 MMOL/L (ref 96–108)
CHOLEST SERPL-MCNC: 197 MG/DL
CO2 SERPL-SCNC: 27 MMOL/L (ref 21–32)
CREAT SERPL-MCNC: 0.88 MG/DL (ref 0.6–1.3)
GFR SERPL CREATININE-BSD FRML MDRD: 105 ML/MIN/1.73SQ M
GLUCOSE P FAST SERPL-MCNC: 103 MG/DL (ref 65–99)
HDLC SERPL-MCNC: 26 MG/DL
NONHDLC SERPL-MCNC: 171 MG/DL
POTASSIUM SERPL-SCNC: 3.8 MMOL/L (ref 3.5–5.3)
PROT SERPL-MCNC: 7.4 G/DL (ref 6.4–8.4)
PSA SERPL-MCNC: 1.5 NG/ML (ref 0–4)
SODIUM SERPL-SCNC: 138 MMOL/L (ref 135–147)
TRIGL SERPL-MCNC: 505 MG/DL

## 2022-09-19 PROCEDURE — 80053 COMPREHEN METABOLIC PANEL: CPT

## 2022-09-19 PROCEDURE — 36415 COLL VENOUS BLD VENIPUNCTURE: CPT

## 2022-09-19 PROCEDURE — G0103 PSA SCREENING: HCPCS

## 2022-09-19 PROCEDURE — 80061 LIPID PANEL: CPT

## 2023-06-09 ENCOUNTER — APPOINTMENT (OUTPATIENT)
Dept: RADIOLOGY | Age: 43
End: 2023-06-09
Payer: COMMERCIAL

## 2023-06-09 DIAGNOSIS — M54.40 ACUTE RIGHT-SIDED LOW BACK PAIN WITH SCIATICA, SCIATICA LATERALITY UNSPECIFIED: ICD-10-CM

## 2023-06-09 PROCEDURE — 72110 X-RAY EXAM L-2 SPINE 4/>VWS: CPT

## 2023-10-02 ENCOUNTER — APPOINTMENT (OUTPATIENT)
Dept: LAB | Age: 43
End: 2023-10-02
Payer: COMMERCIAL

## 2023-10-02 DIAGNOSIS — Z13.6 SCREENING FOR ISCHEMIC HEART DISEASE: ICD-10-CM

## 2023-10-02 DIAGNOSIS — Z13.228 SCREENING FOR PHENYLKETONURIA (PKU): ICD-10-CM

## 2023-10-02 LAB
ALBUMIN SERPL BCP-MCNC: 4.1 G/DL (ref 3.5–5)
ALP SERPL-CCNC: 78 U/L (ref 34–104)
ALT SERPL W P-5'-P-CCNC: 27 U/L (ref 7–52)
ANION GAP SERPL CALCULATED.3IONS-SCNC: 8 MMOL/L
AST SERPL W P-5'-P-CCNC: 20 U/L (ref 13–39)
BILIRUB SERPL-MCNC: 0.64 MG/DL (ref 0.2–1)
BUN SERPL-MCNC: 12 MG/DL (ref 5–25)
CALCIUM SERPL-MCNC: 9.2 MG/DL (ref 8.4–10.2)
CHLORIDE SERPL-SCNC: 103 MMOL/L (ref 96–108)
CHOLEST SERPL-MCNC: 197 MG/DL
CO2 SERPL-SCNC: 29 MMOL/L (ref 21–32)
CREAT SERPL-MCNC: 0.85 MG/DL (ref 0.6–1.3)
GFR SERPL CREATININE-BSD FRML MDRD: 106 ML/MIN/1.73SQ M
GLUCOSE P FAST SERPL-MCNC: 104 MG/DL (ref 65–99)
HDLC SERPL-MCNC: 29 MG/DL
LDLC SERPL CALC-MCNC: 122 MG/DL (ref 0–100)
NONHDLC SERPL-MCNC: 168 MG/DL
POTASSIUM SERPL-SCNC: 4.1 MMOL/L (ref 3.5–5.3)
PROT SERPL-MCNC: 6.9 G/DL (ref 6.4–8.4)
SODIUM SERPL-SCNC: 140 MMOL/L (ref 135–147)
TRIGL SERPL-MCNC: 230 MG/DL

## 2023-10-02 PROCEDURE — 80053 COMPREHEN METABOLIC PANEL: CPT

## 2023-10-02 PROCEDURE — 80061 LIPID PANEL: CPT

## 2023-10-02 PROCEDURE — 36415 COLL VENOUS BLD VENIPUNCTURE: CPT

## 2023-10-20 ENCOUNTER — APPOINTMENT (OUTPATIENT)
Dept: LAB | Age: 43
End: 2023-10-20
Payer: COMMERCIAL

## 2023-10-20 DIAGNOSIS — R73.01 ELEVATED FASTING GLUCOSE: ICD-10-CM

## 2023-10-20 LAB
EST. AVERAGE GLUCOSE BLD GHB EST-MCNC: 114 MG/DL
HBA1C MFR BLD: 5.6 %

## 2023-10-20 PROCEDURE — 83036 HEMOGLOBIN GLYCOSYLATED A1C: CPT

## 2023-10-20 PROCEDURE — 36415 COLL VENOUS BLD VENIPUNCTURE: CPT

## 2025-02-06 ENCOUNTER — EVALUATION (OUTPATIENT)
Dept: PHYSICAL THERAPY | Facility: REHABILITATION | Age: 45
End: 2025-02-06
Payer: COMMERCIAL

## 2025-02-06 DIAGNOSIS — M25.521 RIGHT ELBOW PAIN: Primary | ICD-10-CM

## 2025-02-06 PROCEDURE — 97161 PT EVAL LOW COMPLEX 20 MIN: CPT

## 2025-02-06 PROCEDURE — 97112 NEUROMUSCULAR REEDUCATION: CPT

## 2025-02-06 NOTE — PROGRESS NOTES
PT Evaluation     Today's date: 2025  Patient name: Chet Irene  : 1980  MRN: [id number]  Referring provider: Milton Escobedo PT  Dx:   Encounter Diagnosis     ICD-10-CM    1. Right elbow pain  M25.521           Start Time: 0900  Stop Time: 09  Total time in clinic (min): 43 minutes    Assessment  Impairments: abnormal or restricted ROM, activity intolerance, impaired physical strength, lacks appropriate home exercise program, pain with function and poor posture     Assessment details: Chet Irene is a pleasant 44 y.o. male who presents with R elbow pain. Upon eval today, he demonstrates s/sx consistent with a dx of R elbow tendonitis, demonstrating (+) special testing, poor R  strength, and limited R wrist ext strength resulting in difficulty performing work duties.  No further referral appears necessary at this time based upon examination results.  I expect he will improve within 4 weeks of skilled PT to improve FA mm extensibility and aforementioned deficits of strength.  Positive prognostic indicators include positive attitude toward recovery, good understanding of diagnosis and treatment plan options, acuity of symptoms, absence of peripheralization, and absence of observed red flags.  Negative prognostic indicators include obesity.      Comparable signs:  1)   2) Cozens/fuller    Problem List:  1) Poor  strength  2) Poor wrist ext strength  3) Poor FA tissue extensibility  Understanding of Dx/Px/POC: excellent     Prognosis: excellent    Goals  Impairment based goals  Patient will improve FA strength to 5/5 in all planes by time of d/c in order to improve ease and stability with functional mobility.  Patient will improve R  strength to >/= L  strength by time of d/c in order to improve ease and stability with functional mobility.  Patient will demonstrate (-) cozen's/mills testing.  Patient will report 50% reduction in pain within 3 weeks.  Patient will tolerate a  treatment session centered around addressing deficits of strength and mobility with min c/o pain.      Functional based goals to be completed by time of d/c  Patient will report min FA sx at work.  Patient will improve FOTO to greater than predicted value.  Patient will be independent with home exercise program.   Patient will be able to manage symptoms independently.     Plan    Planned therapy interventions: abdominal trunk stabilization, activity modification, ADL training, balance/weight bearing training, gait training, home exercise program, therapeutic exercise, therapeutic activities, stretching, strengthening, patient education, neuromuscular re-education, postural training, therapeutic training, joint mobilization, IASTM, kinesiology taping, manual therapy, massage, Joshua taping, motor coordination training, muscle pump exercises, nerve gliding, self care, work reintegration, fine motor coordination training, flexibility, functional ROM exercises, graded activity, graded exercise, graded motor, IADL retraining, balance, behavior modification, body mechanics training, breathing training, transfer training and coordination    Frequency: 2x week  Duration in weeks: 12  Treatment plan discussed with: patient        Subjective Evaluation    History of Present Illness  Mechanism of injury: 2025: Chet Irene is a pleasant 44 y.o. male presenting to PT for insidious onset R elbow pain. He reports onset ~1 mo ago. Denies any known TRUNG but states his sx worsened following having to shovel.     Patient Goals  Patient goals for therapy: decreased pain and increased strength    Pain  Current pain ratin  At best pain ratin  At worst pain ratin  Location: medial/lateral elbow  Quality: burning  Relieving factors: medications  Exacerbated by: sup/pro, reaching, lifting.  Progression: worsening    Social Support    Employment status: working (works at computer all day)  Hand dominance:  left          Objective  CERVICAL EXAM  Red flags: None  Gonsales: negative   Babinski: negative   Alar Ligament: negative   Sharp Dian: negative   Vertebral Artery Test: negative     SENSATION   LEFT RIGHT   C2-C3 Intact Intact   C4 Intact Intact   C5 Intact Intact   C6 Intact Intact   C7 Intact Intact   C8 Intact Intact   T1 Intact Intact   T2 Intact Intact     REFLEXES   LEFT RIGHT   TRICEPS 2+ 2+   BICEPS 2+ 2+   BR 2+ 2+     POSTURAL FINDINGS  Fwd head, rounded shoulders, hyperkyphosis    CERVICAL   AROM all WNL    RIGHT LEFT   FLX    EXT    ROT           SHOULDER   AROM PROM STRENGTH    RIGHT LEFT RIGHT LEFT RIGHT LEFT   FLX     5/5 5/5   ABD     5/5 5/5   EXT     5/5 5/5   ER     5/5 5/5   IR     5/5 5/5   U. TRAP     5/5 5/5   M. TRAP     4+/5 4+/5   L. TRAP     4+/5 4+/5   PROTRACTION     4+/5 4+/5       ELBOW   AROM all WFL PROM STRENGTH    RIGHT LEFT RIGHT LEFT RIGHT LEFT   FLX     5/5 5/5   EXT     5/5 5/5   SUP     4+/5 5/5   PRO     5/5 5/5                WRIST   AROM all WFL PROM STRENGTH    RIGHT LEFT RIGHT LEFT RIGHT LEFT   FLX     5/5 5/5   EXT     4+/5 5/5       SPECIAL   LEFT RIGHT   LIGAMENTOUS     Valgus stress  neg   Varus stress  neg   EPICONDYLITIS     Cozen's  pos   Maudsley's     Mill's  pos   Medial Epicondylitis     NEUROLOGICAL     Elbow flexion test     Pinch      Tinel's      ULTT 1     ULTT 2     ULTT 3     ULTT 4     CARPAL TUNNEL     Carpal compression     TInel's     Phalen's       COMMENTS:    L: 118#  R: 65#      Flowsheet Rows      Flowsheet Row Most Recent Value   PT/OT G-Codes    Current Score 59   Projected Score 73               Precautions:   Past Medical History:   Diagnosis Date    Asthma        Allergies   Allergen Reactions    Penicillins      Unknown, childhood allergy       POC expires Unit limit Auth Expiration date PT/OT + Visit Limit?   12 weeks - 5/1/2025 bomn No auth 30         Visit/Unit Tracking  AUTH Status:  Date 2/6        No auth Used 1          Remaining             Pertinent Findings:      POC End Date: 5/1/2025                                                                                    Test / Measure     FOTO (Predicted 73) 51     118# L, 65# R   Wrist ext 4+/5   (+) cozens/mills    Poor posture              Manuals 2/6            R FA IASTM                                                    Neuro Re-Ed             HEP review/pt education 23'            Row              LPD             Face pull             Serratus wall slide                                       Ther Ex             UBE             Wrist flx/ext stretching             Cross body tri stretch             Wrist flex/ext weighted             Sup/pro therabar             Upside down KB carries             90/90 carries             Wall pushups                                       Ther Activity                                       Gait Training                                       Modalities

## 2025-02-06 NOTE — LETTER
2025    Argenis Verde  [address]   [address]  [facility] [address]    Patient: Chet Irene   YOB: 1980   Date of Visit: 2025     Encounter Diagnosis     ICD-10-CM    1. Right elbow pain  M25.521           Dear Dr. Verde:    Thank you for your recent referral of Chet Irene. Please review the attached evaluation summary from Chet's recent visit.     Please verify that you agree with the plan of care by signing the attached order.     If you have any questions or concerns, please do not hesitate to call.     I sincerely appreciate the opportunity to share in the care of one of your patients and hope to have another opportunity to work with you in the near future.       Sincerely,    Milton Escobedo, PT      Referring Provider:      I certify that I have read the below Plan of Care and certify the need for these services furnished under this plan of treatment while under my care.                    Argenis AILYN Ammon  [address]   [address]  [facility] [address]  Via In Basket          PT Evaluation     Today's date: 2025  Patient name: Chet Irene  : 1980  MRN: [id number]  Referring provider: Milton Escobedo, PT  Dx:   Encounter Diagnosis     ICD-10-CM    1. Right elbow pain  M25.521           Start Time: 0900  Stop Time: 943  Total time in clinic (min): 43 minutes    Assessment  Impairments: abnormal or restricted ROM, activity intolerance, impaired physical strength, lacks appropriate home exercise program, pain with function and poor posture     Assessment details: Chet Irene is a pleasant 44 y.o. male who presents with R elbow pain. Upon eval today, he demonstrates s/sx consistent with a dx of R elbow tendonitis, demonstrating (+) special testing, poor R  strength, and limited R wrist ext strength resulting in difficulty performing work duties.  No further referral appears necessary at this time based upon examination  results.  I expect he will improve within 4 weeks of skilled PT to improve FA mm extensibility and aforementioned deficits of strength.  Positive prognostic indicators include positive attitude toward recovery, good understanding of diagnosis and treatment plan options, acuity of symptoms, absence of peripheralization, and absence of observed red flags.  Negative prognostic indicators include obesity.      Comparable signs:  1)   2) Cozens/fuller    Problem List:  1) Poor  strength  2) Poor wrist ext strength  3) Poor FA tissue extensibility  Understanding of Dx/Px/POC: excellent     Prognosis: excellent    Goals  Impairment based goals  Patient will improve FA strength to 5/5 in all planes by time of d/c in order to improve ease and stability with functional mobility.  Patient will improve R  strength to >/= L  strength by time of d/c in order to improve ease and stability with functional mobility.  Patient will demonstrate (-) cozen's/mills testing.  Patient will report 50% reduction in pain within 3 weeks.  Patient will tolerate a treatment session centered around addressing deficits of strength and mobility with min c/o pain.      Functional based goals to be completed by time of d/c  Patient will report min FA sx at work.  Patient will improve FOTO to greater than predicted value.  Patient will be independent with home exercise program.   Patient will be able to manage symptoms independently.     Plan    Planned therapy interventions: abdominal trunk stabilization, activity modification, ADL training, balance/weight bearing training, gait training, home exercise program, therapeutic exercise, therapeutic activities, stretching, strengthening, patient education, neuromuscular re-education, postural training, therapeutic training, joint mobilization, IASTM, kinesiology taping, manual therapy, massage, Joshua taping, motor coordination training, muscle pump exercises, nerve gliding, self care,  work reintegration, fine motor coordination training, flexibility, functional ROM exercises, graded activity, graded exercise, graded motor, IADL retraining, balance, behavior modification, body mechanics training, breathing training, transfer training and coordination    Frequency: 2x week  Duration in weeks: 12  Treatment plan discussed with: patient        Subjective Evaluation    History of Present Illness  Mechanism of injury: 2025: Chet Irene is a pleasant 44 y.o. male presenting to PT for insidious onset R elbow pain. He reports onset ~1 mo ago. Denies any known TRUNG but states his sx worsened following having to shovel.     Patient Goals  Patient goals for therapy: decreased pain and increased strength    Pain  Current pain ratin  At best pain ratin  At worst pain ratin  Location: medial/lateral elbow  Quality: burning  Relieving factors: medications  Exacerbated by: sup/pro, reaching, lifting.  Progression: worsening    Social Support    Employment status: working (works at computer all day)  Hand dominance: left          Objective  CERVICAL EXAM  Red flags: None  Gonsales: negative   Babinski: negative   Alar Ligament: negative   Sharp Dian: negative   Vertebral Artery Test: negative     SENSATION   LEFT RIGHT   C2-C3 Intact Intact   C4 Intact Intact   C5 Intact Intact   C6 Intact Intact   C7 Intact Intact   C8 Intact Intact   T1 Intact Intact   T2 Intact Intact     REFLEXES   LEFT RIGHT   TRICEPS 2+ 2+   BICEPS 2+ 2+   BR 2+ 2+     POSTURAL FINDINGS  Fwd head, rounded shoulders, hyperkyphosis    CERVICAL   AROM all WNL    RIGHT LEFT   FLX    EXT    ROT           SHOULDER   AROM PROM STRENGTH    RIGHT LEFT RIGHT LEFT RIGHT LEFT   FLX     5/5 5/5   ABD     5/5 5/5   EXT     5/5 5/5   ER     5/5 5/5   IR     5/5 5/5   U. TRAP     5/5 5/5   M. TRAP     4+/5 4+/5   L. TRAP     4+/5 4+/5   PROTRACTION     4+/5 4+/5       ELBOW   AROM all WFL PROM STRENGTH    RIGHT LEFT RIGHT LEFT RIGHT  LEFT   FLX     5/5 5/5   EXT     5/5 5/5   SUP     4+/5 5/5   PRO     5/5 5/5                WRIST   AROM all WFL PROM STRENGTH    RIGHT LEFT RIGHT LEFT RIGHT LEFT   FLX     5/5 5/5   EXT     4+/5 5/5       SPECIAL   LEFT RIGHT   LIGAMENTOUS     Valgus stress  neg   Varus stress  neg   EPICONDYLITIS     Cozen's  pos   Maudsley's     Mill's  pos   Medial Epicondylitis     NEUROLOGICAL     Elbow flexion test     Pinch      Tinel's      ULTT 1     ULTT 2     ULTT 3     ULTT 4     CARPAL TUNNEL     Carpal compression     TInel's     Phalen's       COMMENTS:    L: 118#  R: 65#      Flowsheet Rows      Flowsheet Row Most Recent Value   PT/OT G-Codes    Current Score 59   Projected Score 73               Precautions:   Past Medical History:   Diagnosis Date   • Asthma        Allergies   Allergen Reactions   • Penicillins      Unknown, childhood allergy       POC expires Unit limit Auth Expiration date PT/OT + Visit Limit?   12 weeks - 5/1/2025 bomn No auth 30         Visit/Unit Tracking  AUTH Status:  Date 2/6        No auth Used 1         Remaining             Pertinent Findings:      POC End Date: 5/1/2025                                                                                    Test / Measure     FOTO (Predicted 73) 51     118# L, 65# R   Wrist ext 4+/5   (+) cozens/mills    Poor posture              Manuals 2/6            R FA IASTM                                                    Neuro Re-Ed             HEP review/pt education 23'            Row              LPD             Face pull             Serratus wall slide                                       Ther Ex             UBE             Wrist flx/ext stretching             Cross body tri stretch             Wrist flex/ext weighted             Sup/pro therabar             Upside down KB carries             90/90 carries             Wall pushups                                       Ther Activity                                       Gait  Training                                       Modalities

## 2025-02-10 ENCOUNTER — OFFICE VISIT (OUTPATIENT)
Dept: PHYSICAL THERAPY | Facility: REHABILITATION | Age: 45
End: 2025-02-10
Payer: COMMERCIAL

## 2025-02-10 DIAGNOSIS — M25.521 RIGHT ELBOW PAIN: Primary | ICD-10-CM

## 2025-02-10 PROCEDURE — 97110 THERAPEUTIC EXERCISES: CPT

## 2025-02-10 PROCEDURE — 97112 NEUROMUSCULAR REEDUCATION: CPT

## 2025-02-10 PROCEDURE — 97140 MANUAL THERAPY 1/> REGIONS: CPT

## 2025-02-10 NOTE — PROGRESS NOTES
"Daily Note     Today's date: 2/10/2025  Patient name: Chet Irene  : 1980  MRN: [id number]  Referring provider: Milton Escobedo, PT  Dx:   Encounter Diagnosis     ICD-10-CM    1. Right elbow pain  M25.521           Start Time: 1820  Stop Time: 1900  Total time in clinic (min): 40 minutes    Subjective: No change in status.      Objective: See treatment diary below      Assessment: Tolerated treatment well. Focused on wrist strength and postural mm stability with good tolerance. Will assess response NV and progress as tolerated. Patient would benefit from continued PT      Plan: Continue per plan of care.      Precautions:   Past Medical History:   Diagnosis Date    Asthma        Allergies   Allergen Reactions    Penicillins      Unknown, childhood allergy       POC expires Unit limit Auth Expiration date PT/OT + Visit Limit?   12 weeks - 2025 bomn No auth 30         Visit/Unit Tracking  AUTH Status:  Date 2/6 2/10       No auth Used 1 2        Remaining             Pertinent Findings:      POC End Date: 2025                                                                                    Test / Measure     FOTO (Predicted 73) 51     118# L, 65# R   Wrist ext 4+/5   (+) cozens/mills    Poor posture          Access Code: [email or web address]  URL: [email or web address]  Date: 02/10/2025  Prepared by: Milton Escobedo    Exercises  - Wrist Flexion with Resistance  - 1 x daily - 3 x weekly - 3 sets - 10 reps  - Wrist Extension with Resistance  - 1 x daily - 3 x weekly - 3 sets - 10 reps    Manuals 2/6 2/10           R FA IASTM  CM 10'                                                  Neuro Re-Ed             HEP review/pt education 23'            Row   2x15 15#           LPD  2x15 10#           Face pull             Serratus wall slide                                       Ther Ex             UBE  3/3 L0           Wrist flx/ext stretching  3x30\" ea           Tri ext with rope  2x10 15#         "   Wrist flex/ext weighted  2x15 ea 3#           Sup/pro therabar  2x15 ea green bar           Upside down KB carries             90/90 carries             Wall pushups                                       Ther Activity                                       Gait Training                                       Modalities

## 2025-02-13 ENCOUNTER — APPOINTMENT (OUTPATIENT)
Dept: PHYSICAL THERAPY | Facility: REHABILITATION | Age: 45
End: 2025-02-13
Payer: COMMERCIAL

## 2025-02-17 ENCOUNTER — OFFICE VISIT (OUTPATIENT)
Dept: PHYSICAL THERAPY | Facility: REHABILITATION | Age: 45
End: 2025-02-17
Payer: COMMERCIAL

## 2025-02-17 DIAGNOSIS — M25.521 RIGHT ELBOW PAIN: Primary | ICD-10-CM

## 2025-02-17 PROCEDURE — 97140 MANUAL THERAPY 1/> REGIONS: CPT

## 2025-02-17 PROCEDURE — 97110 THERAPEUTIC EXERCISES: CPT

## 2025-02-17 PROCEDURE — 97112 NEUROMUSCULAR REEDUCATION: CPT

## 2025-02-17 NOTE — PROGRESS NOTES
"Daily Note     Today's date: 2025  Patient name: Chet Irene  : 1980  MRN: [id number]  Referring provider: Milton Escobedo, PT  Dx:   Encounter Diagnosis     ICD-10-CM    1. Right elbow pain  M25.521             Start Time:   Stop Time:   Total time in clinic (min): 40 minutes    Subjective: It feels a little better.      Objective: See treatment diary below      Assessment: Tolerated treatment well. Cont with progression of wrist strengthening and postural mm stability interventions with good tolerance. Will progress as tolerated. Patient would benefit from continued PT      Plan: Continue per plan of care.      Precautions:   Past Medical History:   Diagnosis Date    Asthma        Allergies   Allergen Reactions    Penicillins      Unknown, childhood allergy       POC expires Unit limit Auth Expiration date PT/OT + Visit Limit?   12 weeks - 2025 bomn No auth 30         Visit/Unit Tracking  AUTH Status:  Date 2/6 2/10 2/17      No auth Used 1 2 3       Remaining             Pertinent Findings:      POC End Date: 2025                                                                                    Test / Measure     FOTO (Predicted 73) 51     118# L, 65# R   Wrist ext 4+/5   (+) cozens/mills    Poor posture          Access Code: [email or web address]  URL: [email or web address]  Date: 02/10/2025  Prepared by: Milton Escobedo    Exercises  - Wrist Flexion with Resistance  - 1 x daily - 3 x weekly - 3 sets - 10 reps  - Wrist Extension with Resistance  - 1 x daily - 3 x weekly - 3 sets - 10 reps    Manuals 2/6 2/10 2/17          R FA IASTM  CM 10' CM 10'                                                 Neuro Re-Ed             HEP review/pt education 23'            Row   2x15 15# 2x15 15#          LPD  2x15 10# 2x15 10#          Face pull             Serratus wall slide                                       Ther Ex             UBE  3/3 L0 3/3 L0          Wrist flx/ext stretching  3x30\" " "ea 3x30\" ea          Tri ext with rope  2x10 15# 2x10 15#          Wrist flex/ext weighted  2x15 ea 3# 2x15 ea 3#          Sup/pro therabar  2x15 ea green bar 2x15 ea green bar          Concentration curl   2x10 10#          Upside down DB carries             90/90 carries             Shoulder taps   At railing 2x10                                    Ther Activity                                       Gait Training                                       Modalities                                            "

## 2025-02-20 ENCOUNTER — OFFICE VISIT (OUTPATIENT)
Dept: PHYSICAL THERAPY | Facility: REHABILITATION | Age: 45
End: 2025-02-20
Payer: COMMERCIAL

## 2025-02-20 DIAGNOSIS — M25.521 RIGHT ELBOW PAIN: Primary | ICD-10-CM

## 2025-02-20 PROCEDURE — 97112 NEUROMUSCULAR REEDUCATION: CPT

## 2025-02-20 PROCEDURE — 97140 MANUAL THERAPY 1/> REGIONS: CPT

## 2025-02-20 PROCEDURE — 97110 THERAPEUTIC EXERCISES: CPT

## 2025-02-20 NOTE — PROGRESS NOTES
Daily Note     Today's date: 2025  Patient name: Chet Irene  : 1980  MRN: [id number]  Referring provider: Milton Escobedo, PT  Dx:   Encounter Diagnosis     ICD-10-CM    1. Right elbow pain  M25.521               Start Time: 175  Stop Time: 183  Total time in clinic (min): 38 minutes    Subjective: Holger reports mild soreness following LV.      Objective: See treatment diary below      Assessment: Tolerated treatment well. Mild soreness present throughout with progression of existing interventions. High fatigue and shoulder hiking present with the addition of OH interventions. Will address this in future visits. Patient would benefit from continued PT      Plan: Continue per plan of care.      Precautions:   Past Medical History:   Diagnosis Date    Asthma        Allergies   Allergen Reactions    Penicillins      Unknown, childhood allergy       POC expires Unit limit Auth Expiration date PT/OT + Visit Limit?   12 weeks - 2025 bomn No auth 30         Visit/Unit Tracking  AUTH Status:  Date 2/6 2/10 2/17 2/20     No auth Used 1 2 3 4      Remaining             Pertinent Findings:      POC End Date: 2025                                                                                    Test / Measure     FOTO (Predicted 73) 51     118# L, 65# R   Wrist ext 4+/5   (+) cozens/mills    Poor posture          Access Code: [email or web address]  URL: [email or web address]  Date: 02/10/2025  Prepared by: Milton Escobedo    Exercises  - Wrist Flexion with Resistance  - 1 x daily - 3 x weekly - 3 sets - 10 reps  - Wrist Extension with Resistance  - 1 x daily - 3 x weekly - 3 sets - 10 reps    Manuals 2/6 2/10 2/17 2/20         R FA IASTM  CM 10' CM 10' CM 10'                                                Neuro Re-Ed             HEP review/pt education 23'            Row   2x15 15# 2x15 15# 2x10 20#         LPD  2x15 10# 2x15 10# 2x15 10#         Face pull             Serratus wall slide            Todd Dykes APN updated with pt's BP reading. "  Hor abd             ER/IR                                       Ther Ex             UBE  3/3 L0 3/3 L0 3/3 L0         Wrist flx/ext stretching  3x30\" ea 3x30\" ea 3x30\" ea         Tri ext with rope  2x10 15# 2x10 15# 2x10 15#         Wrist flex/ext weighted  2x15 ea 3# 2x15 ea 3# Held (already performed)         Sup/pro therabar  2x15 ea green bar 2x15 ea green bar 2x15 ea green bar         Concentration curl   2x10 10# 2x10 10#         Upside down KB carries              90/90 carries    5# DB 2x3 laps         Shoulder taps   At railing 2x10 At railing 2x10                                   Ther Activity                                       Gait Training                                       Modalities                                            "

## 2025-02-25 ENCOUNTER — OFFICE VISIT (OUTPATIENT)
Dept: PHYSICAL THERAPY | Facility: REHABILITATION | Age: 45
End: 2025-02-25
Payer: COMMERCIAL

## 2025-02-25 DIAGNOSIS — M25.521 RIGHT ELBOW PAIN: Primary | ICD-10-CM

## 2025-02-25 PROCEDURE — 97112 NEUROMUSCULAR REEDUCATION: CPT

## 2025-02-25 PROCEDURE — 97140 MANUAL THERAPY 1/> REGIONS: CPT

## 2025-02-25 PROCEDURE — 97110 THERAPEUTIC EXERCISES: CPT

## 2025-02-25 NOTE — PROGRESS NOTES
Daily Note     Today's date: 2025  Patient name: Chet Irene  : 1980  MRN: [id number]  Referring provider: Milton Escobedo, PT  Dx:   Encounter Diagnosis     ICD-10-CM    1. Right elbow pain  M25.521               Start Time: 1705  Stop Time: 1745  Total time in clinic (min): 40 minutes    Subjective: No change in status.      Objective: See treatment diary below      Assessment: Tolerated treatment fair. High fatigue with low tolerance to FA/shoulder endurance interventions. FA soreness present throughout. Will progress as tolerated. Patient would benefit from continued PT      Plan: Continue per plan of care.      Precautions:   Past Medical History:   Diagnosis Date    Asthma        Allergies   Allergen Reactions    Penicillins      Unknown, childhood allergy       POC expires Unit limit Auth Expiration date PT/OT + Visit Limit?   12 weeks - 2025 bomn No auth 30         Visit/Unit Tracking  AUTH Status:  Date 2/6 2/10 2/17 2/20 2/25    No auth Used 1 2 3 4 5     Remaining             Pertinent Findings:      POC End Date: 2025                                                                                    Test / Measure     FOTO (Predicted 73) 51     118# L, 65# R   Wrist ext 4+/5   (+) cozens/mills    Poor posture          Access Code: [email or web address]  URL: [email or web address]  Date: 02/10/2025  Prepared by: Milton Escobedo    Exercises  - Wrist Flexion with Resistance  - 1 x daily - 3 x weekly - 3 sets - 10 reps  - Wrist Extension with Resistance  - 1 x daily - 3 x weekly - 3 sets - 10 reps    Manuals 2/6 2/10 2/17 2/20 2/25        R FA IASTM  CM 10' CM 10' CM 10' CM 10'                                               Neuro Re-Ed             HEP review/pt education 23'            Row   2x15 15# 2x15 15# 2x10 20# 2x10 20#        LPD  2x15 10# 2x15 10# 2x15 10# 2x10 15#        Face pull             Serratus wall slide             Hor abd     NV        ER/IR                         "               Ther Ex             UBE  3/3 L0 3/3 L0 3/3 L0 3/3 L0        Wrist flx/ext stretching  3x30\" ea 3x30\" ea 3x30\" ea 3x30\" ea        Tri ext with rope  2x10 15# 2x10 15# 2x10 15# 2x10 15#        Wrist flex/ext weighted  2x15 ea 3# 2x15 ea 3# Held (already performed) 2x10 ea 5#        Sup/pro therabar  2x15 ea green bar 2x15 ea green bar 2x15 ea green bar         Concentration curl   2x10 10# 2x10 10# 2x10 10#        Upside down KB carries      5 laps 5#        90/90 carries    5# DB 2x3 laps 5# DB 2x3 laps        Shoulder taps   At railing 2x10 At railing 2x10 At railing 2x12                                  Ther Activity                                       Gait Training                                       Modalities                                            "

## 2025-02-27 ENCOUNTER — OFFICE VISIT (OUTPATIENT)
Dept: PHYSICAL THERAPY | Facility: REHABILITATION | Age: 45
End: 2025-02-27
Payer: COMMERCIAL

## 2025-02-27 DIAGNOSIS — M25.521 RIGHT ELBOW PAIN: Primary | ICD-10-CM

## 2025-02-27 PROCEDURE — 97112 NEUROMUSCULAR REEDUCATION: CPT

## 2025-02-27 PROCEDURE — 97110 THERAPEUTIC EXERCISES: CPT

## 2025-02-27 PROCEDURE — 97140 MANUAL THERAPY 1/> REGIONS: CPT

## 2025-02-27 NOTE — PROGRESS NOTES
Daily Note     Today's date: 2025  Patient name: Chet Irene  : 1980  MRN: [id number]  Referring provider: Milton Escobedo, PT  Dx:   Encounter Diagnosis     ICD-10-CM    1. Right elbow pain  M25.521               Start Time: 1750  Stop Time: 1828  Total time in clinic (min): 38 minutes    Subjective: No change in status.      Objective: See treatment diary below      Assessment: Tolerated treatment fair. Cont with progression of existing interventions as charted below with fair tolerance, soreness present throughout. Ongoing R shoulder hike present with scap stability interventions, improvements noted with cueing. Cueing to prevent wrist flexion with bicep curls effective in improving sx. Will progress as tolerated. Patient would benefit from continued PT      Plan: Continue per plan of care.      Precautions:   Past Medical History:   Diagnosis Date    Asthma        Allergies   Allergen Reactions    Penicillins      Unknown, childhood allergy       POC expires Unit limit Auth Expiration date PT/OT + Visit Limit?   12 weeks - 2025 bomn No auth 30         Visit/Unit Tracking  AUTH Status:  Date 2/6 2/10 2/17 2/20 2/25 2/27   No auth Used 1 2 3 4 5 6    Remaining             Pertinent Findings:      POC End Date: 2025                                                                                    Test / Measure     FOTO (Predicted 73) 51     118# L, 65# R   Wrist ext 4+/5   (+) cozens/mills    Poor posture          Access Code: [email or web address]  URL: [email or web address]  Date: 02/10/2025  Prepared by: Milton Escobedo    Exercises  - Wrist Flexion with Resistance  - 1 x daily - 3 x weekly - 3 sets - 10 reps  - Wrist Extension with Resistance  - 1 x daily - 3 x weekly - 3 sets - 10 reps    Manuals 2/6 2/10 2/17 2/20 2/25 2/27       R FA IASTM  CM 10' CM 10' CM 10' CM 10' CM 10'                                              Neuro Re-Ed             HEP review/pt education 23'           "  Row   2x15 15# 2x15 15# 2x10 20# 2x10 20# 2x10 20#       LPD  2x15 10# 2x15 10# 2x15 10# 2x10 15# 2x10 15#       Face pull             Serratus wall slide             Hor abd     NV 2x10 ytb standing at wall       ER/IR                                       Ther Ex             UBE  3/3 L0 3/3 L0 3/3 L0 3/3 L0 3/3 L0       Wrist flx/ext stretching  3x30\" ea 3x30\" ea 3x30\" ea 3x30\" ea 3x30\" ea       Tri ext with rope  2x10 15# 2x10 15# 2x10 15# 2x10 15# 2x10 15#       Wrist flex/ext weighted  2x15 ea 3# 2x15 ea 3# Held (already performed) 2x10 ea 5# 2x10 ea 5#       Radial dev      2x10 hammer       Sup/pro therabar  2x15 ea green bar 2x15 ea green bar 2x15 ea green bar         Concentration curl   2x10 10# 2x10 10# 2x10 10# 2x10 10# handles chica       Upside down KB carries      5 laps 5# 2x3 laps 5#       90/90 carries    5# DB 2x3 laps 5# DB 2x3 laps Resume NV       Shoulder taps   At railing 2x10 At railing 2x10 At railing 2x12 Resume NV                                 Ther Activity                                       Gait Training                                       Modalities                                            "

## 2025-03-04 ENCOUNTER — OFFICE VISIT (OUTPATIENT)
Dept: PHYSICAL THERAPY | Facility: REHABILITATION | Age: 45
End: 2025-03-04
Payer: COMMERCIAL

## 2025-03-04 DIAGNOSIS — M25.521 RIGHT ELBOW PAIN: Primary | ICD-10-CM

## 2025-03-04 PROCEDURE — 97112 NEUROMUSCULAR REEDUCATION: CPT

## 2025-03-04 PROCEDURE — 97140 MANUAL THERAPY 1/> REGIONS: CPT

## 2025-03-04 PROCEDURE — 97110 THERAPEUTIC EXERCISES: CPT

## 2025-03-04 NOTE — PROGRESS NOTES
Daily Note     Today's date: 3/4/2025  Patient name: Chet Irene  : 1980  MRN: [id number]  Referring provider: Milton Escobedo, PT  Dx:   Encounter Diagnosis     ICD-10-CM    1. Right elbow pain  M25.521               Start Time: 174  Stop Time:   Total time in clinic (min): 40 minutes    Subjective: It feels a little better.      Objective: See treatment diary below      Assessment: Tolerated treatment well. Improved tolerance to strengthening interventions today. Cueing needed to prevent R shoulder hike but otherwise good form throughout. Patient would benefit from continued PT      Plan: Continue per plan of care.      Precautions:   Past Medical History:   Diagnosis Date    Asthma        Allergies   Allergen Reactions    Penicillins      Unknown, childhood allergy       POC expires Unit limit Auth Expiration date PT/OT + Visit Limit?   12 weeks - 2025 bomn No auth 30         Visit/Unit Tracking  AUTH Status:  Date 2/6 2/10 2/17 2/20 2/25 2/27 3/4   No auth Used 1 2 3 4 5 6 7    Remaining              Pertinent Findings:      POC End Date: 2025                                                                                    Test / Measure     FOTO (Predicted 73) 51     118# L, 65# R   Wrist ext 4+/5   (+) cozens/mills    Poor posture          Access Code: [email or web address]  URL: [email or web address]  Date: 02/10/2025  Prepared by: Milton Escobedo    Exercises  - Wrist Flexion with Resistance  - 1 x daily - 3 x weekly - 3 sets - 10 reps  - Wrist Extension with Resistance  - 1 x daily - 3 x weekly - 3 sets - 10 reps    Manuals 2/6 2/10 2/17 2/20 2/25 2/27 3/4      R FA IASTM  CM 10' CM 10' CM 10' CM 10' CM 10' CM 10'                                             Neuro Re-Ed             HEP review/pt education 23'            Row   2x15 15# 2x15 15# 2x10 20# 2x10 20# 2x10 20# 2x10 20#      LPD  2x15 10# 2x15 10# 2x15 10# 2x10 15# 2x10 15# 2x10 15#      Face pull             Serratus wall  "slide             Hor abd     NV 2x10 ytb standing at wall 2x10 ytb standing at wall      ER/IR                                       Ther Ex             UBE  3/3 L0 3/3 L0 3/3 L0 3/3 L0 3/3 L0 3/3 L0      Wrist flx/ext stretching  3x30\" ea 3x30\" ea 3x30\" ea 3x30\" ea 3x30\" ea 3x30\" ea      Tri ext with rope  2x10 15# 2x10 15# 2x10 15# 2x10 15# 2x10 15# 2x10 15#      Wrist flex/ext weighted  2x15 ea 3# 2x15 ea 3# Held (already performed) 2x10 ea 5# 2x10 ea 5# Resume NV      Radial dev      2x10 hammer Resume NV      Sup/pro therabar  2x15 ea green bar 2x15 ea green bar 2x15 ea green bar         Curls    2x10 10# 2x10 10# 2x10 10# 2x10 10# handles chica 2x10 15# handes chica      Upside down KB carries      5 laps 5# 2x3 laps 5# 2x3 laps 5#      90/90 carries    5# DB 2x3 laps 5# DB 2x3 laps Resume NV 5# DB 2x3 laps      Shoulder taps   At railing 2x10 At railing 2x10 At railing 2x12 Resume NV 2x12 at table                                Ther Activity                                       Gait Training                                       Modalities                                            "

## 2025-03-06 ENCOUNTER — OFFICE VISIT (OUTPATIENT)
Dept: PHYSICAL THERAPY | Facility: REHABILITATION | Age: 45
End: 2025-03-06
Payer: COMMERCIAL

## 2025-03-06 DIAGNOSIS — M25.521 RIGHT ELBOW PAIN: Primary | ICD-10-CM

## 2025-03-06 PROCEDURE — 97140 MANUAL THERAPY 1/> REGIONS: CPT

## 2025-03-06 PROCEDURE — 97112 NEUROMUSCULAR REEDUCATION: CPT

## 2025-03-06 PROCEDURE — 97110 THERAPEUTIC EXERCISES: CPT

## 2025-03-06 NOTE — PROGRESS NOTES
Daily Note     Today's date: 3/6/2025  Patient name: Chet Irene  : 1980  MRN: [id number]  Referring provider: Milton Escobedo, PT  Dx:   Encounter Diagnosis     ICD-10-CM    1. Right elbow pain  M25.521               Start Time: 1730  Stop Time: 1810  Total time in clinic (min): 40 minutes    Subjective: Holger reports improvements, denies soreness upon arrival.      Objective: See treatment diary below      Assessment: Tolerated treatment well. Improved tolerance to strengthening interventions today with subjective reports of improvements in strength. Will progress as tolerated. Patient would benefit from continued PT      Plan: Continue per plan of care.      Precautions:   Past Medical History:   Diagnosis Date    Asthma        Allergies   Allergen Reactions    Penicillins      Unknown, childhood allergy       POC expires Unit limit Auth Expiration date PT/OT + Visit Limit?   12 weeks - 2025 bomn No auth 30         Visit/Unit Tracking  AUTH Status:  Date 2/6 2/10 2/17 2/20 2/25 2/27 3/4 3/6   No auth Used 1 2 3 4 5 6 7 8    Remaining               Pertinent Findings:      POC End Date: 2025                                                                                    Test / Measure     FOTO (Predicted 73) 51     118# L, 65# R   Wrist ext 4+/5   (+) cozens/mills    Poor posture          Access Code: [email or web address]  URL: [email or web address]  Date: 02/10/2025  Prepared by: Milton Escobedo    Exercises  - Wrist Flexion with Resistance  - 1 x daily - 3 x weekly - 3 sets - 10 reps  - Wrist Extension with Resistance  - 1 x daily - 3 x weekly - 3 sets - 10 reps    Manuals 2/27 3/4 3/6     R FA IASTM CM 10' CM 10' CM 10'                             Neuro Re-Ed        HEP review/pt education        Row  2x10 20# 2x10 20# 2x10 20#     LPD 2x10 15# 2x10 15# 2x10 15#     Face pull        Serratus wall slide        Hor abd 2x10 ytb standing at wall 2x10 ytb standing at wall 2x10 ytb standing at  "wall     ER/IR                        Ther Ex        UBE 3/3 L0 3/3 L0 3/3 L0     Wrist flx/ext stretching 3x30\" ea 3x30\" ea 3x30\" ea     Tri ext with rope 2x10 15# 2x10 15# 2x10 15#     Wrist flex/ext weighted 2x10 ea 5# Resume NV Held (performed yesterday)     Radial dev 2x10 hammer Resume NV Held (performed yesterday)     Sup/pro therabar        Curls  2x10 10# handles chica 2x10 15# handes chica 2x10 20# handles chica     Upside down KB carries  2x3 laps 5# 2x3 laps 5# 2x3 laps 5#     90/90 carries Resume NV 5# DB 2x3 laps 5# DB 2x3 laps     Shoulder taps Resume NV 2x12 at table 2x12 at table                     Ther Activity                        Gait Training                        Modalities                             "

## 2025-03-10 ENCOUNTER — APPOINTMENT (OUTPATIENT)
Dept: PHYSICAL THERAPY | Facility: REHABILITATION | Age: 45
End: 2025-03-10
Payer: COMMERCIAL

## 2025-03-11 ENCOUNTER — OFFICE VISIT (OUTPATIENT)
Dept: PHYSICAL THERAPY | Facility: REHABILITATION | Age: 45
End: 2025-03-11
Payer: COMMERCIAL

## 2025-03-11 DIAGNOSIS — M25.521 RIGHT ELBOW PAIN: Primary | ICD-10-CM

## 2025-03-11 PROCEDURE — 97110 THERAPEUTIC EXERCISES: CPT

## 2025-03-11 PROCEDURE — 97112 NEUROMUSCULAR REEDUCATION: CPT

## 2025-03-11 NOTE — PROGRESS NOTES
Daily Note     Today's date: 3/11/2025  Patient name: Chet Irene  : 1980  MRN: [id number]  Referring provider: Milton Escobedo, PT  Dx:   Encounter Diagnosis     ICD-10-CM    1. Right elbow pain  M25.521                 Start Time:   Stop Time:   Total time in clinic (min): 23 minutes    Subjective: Holger reports mild soreness upon arrival, hand some hand soreness over the weekend.      Objective: See treatment diary below      Assessment: Tolerated treatment well. Cont with progression of existing interventions as charted below with good tolerance and without sx increase. Will progress as tolerated Patient would benefit from continued PT      Plan: Continue per plan of care.      Precautions:   Past Medical History:   Diagnosis Date    Asthma        Allergies   Allergen Reactions    Penicillins      Unknown, childhood allergy       POC expires Unit limit Auth Expiration date PT/OT + Visit Limit?   12 weeks - 2025 bomn No auth 30         Visit/Unit Tracking  AUTH Status:  Date 2/6 2/10 2/17 2/20 2/25 2/27 3/4 3/6 3/11   No auth Used 1 2 3 4 5 6 7 8 9    Remaining                Pertinent Findings:      POC End Date: 2025                                                                                    Test / Measure     FOTO (Predicted 73) 51     118# L, 65# R   Wrist ext 4+/5   (+) cozens/mills    Poor posture          Access Code: [email or web address]  URL: [email or web address]  Date: 02/10/2025  Prepared by: Milton Escobedo    Exercises  - Wrist Flexion with Resistance  - 1 x daily - 3 x weekly - 3 sets - 10 reps  - Wrist Extension with Resistance  - 1 x daily - 3 x weekly - 3 sets - 10 reps    Manuals 2/27 3/4 3/6 3/11    R FA IASTM CM 10' CM 10' CM 10' CM 10'                            Neuro Re-Ed        HEP review/pt education        Row  2x10 20# 2x10 20# 2x10 20# 2x10 22#    LPD 2x10 15# 2x10 15# 2x10 15# 2x10 15#    Face pull        Serratus wall slide        Hor abd 2x10 ytb  "standing at wall 2x10 ytb standing at wall 2x10 ytb standing at wall 2x10 ytb standing at wall    ER/IR                        Ther Ex        UBE 3/3 L0 3/3 L0 3/3 L0 3/3 L0    Wrist flx/ext stretching 3x30\" ea 3x30\" ea 3x30\" ea 3x30\" ea    Tri ext with rope 2x10 15# 2x10 15# 2x10 15# 2x10 15#    Wrist flex/ext weighted 2x10 ea 5# Resume NV Held (performed yesterday) 3x10 ea 5#    Radial dev 2x10 hammer Resume NV Held (performed yesterday) 3x10 hammer    Sup/pro therabar        Curls  2x10 10# handles chica 2x10 15# handes chica 2x10 20# handles chica 3x10 20# handles    Upside down KB carries  2x3 laps 5# 2x3 laps 5# 2x3 laps 5# 2x3 laps 5#    90/90 carries Resume NV 5# DB 2x3 laps 5# DB 2x3 laps 2x3 laps 5#    Shoulder taps Resume NV 2x12 at table 2x12 at table 2x12 at table                    Ther Activity                        Gait Training                        Modalities                             "

## 2025-03-13 ENCOUNTER — OFFICE VISIT (OUTPATIENT)
Dept: PHYSICAL THERAPY | Facility: REHABILITATION | Age: 45
End: 2025-03-13
Payer: COMMERCIAL

## 2025-03-13 DIAGNOSIS — M25.521 RIGHT ELBOW PAIN: Primary | ICD-10-CM

## 2025-03-13 PROCEDURE — 97112 NEUROMUSCULAR REEDUCATION: CPT

## 2025-03-13 PROCEDURE — 97110 THERAPEUTIC EXERCISES: CPT

## 2025-03-13 PROCEDURE — 97140 MANUAL THERAPY 1/> REGIONS: CPT

## 2025-03-13 NOTE — PROGRESS NOTES
Daily Note     Today's date: 3/13/2025  Patient name: Chet Irene  : 1980  MRN: [id number]  Referring provider: Milton Escobedo, PT  Dx:   Encounter Diagnosis     ICD-10-CM    1. Right elbow pain  M25.521                 Start Time:   Stop Time:   Total time in clinic (min): 40 minutes    Subjective: Holger reports DOMS following LV, no sx today.      Objective: See treatment diary below      Assessment: Tolerated treatment well. Cont to demonstrate good tolerance to strengthening interventions. Will progress as tolerated Patient would benefit from continued PT      Plan: Continue per plan of care.      Precautions:   Past Medical History:   Diagnosis Date    Asthma        Allergies   Allergen Reactions    Penicillins      Unknown, childhood allergy       POC expires Unit limit Auth Expiration date PT/OT + Visit Limit?   12 weeks - 2025 bomn No auth 30         Visit/Unit Tracking  AUTH Status:  Date 2/6 2/10 2/17 2/20 2/25 2/27 3/4 3/6 3/11 3/13   No auth Used 1 2 3 4 5 6 7 8 9 10    Remaining                 Pertinent Findings:      POC End Date: 2025                                                                                    Test / Measure     FOTO (Predicted 73) 51     118# L, 65# R   Wrist ext 4+/5   (+) cozens/mills    Poor posture          Access Code: [email or web address]  URL: [email or web address]  Date: 02/10/2025  Prepared by: Milton Escobedo    Exercises  - Wrist Flexion with Resistance  - 1 x daily - 3 x weekly - 3 sets - 10 reps  - Wrist Extension with Resistance  - 1 x daily - 3 x weekly - 3 sets - 10 reps    Manuals 2/27 3/4 3/6 3/11 3/13   R FA IASTM CM 10' CM 10' CM 10' CM 10' CM 10'                           Neuro Re-Ed        HEP review/pt education        Row  2x10 20# 2x10 20# 2x10 20# 2x10 22# 2x10 22#   LPD 2x10 15# 2x10 15# 2x10 15# 2x10 15# 2x10 15#   Face pull        Serratus wall slide        Hor abd 2x10 ytb standing at wall 2x10 ytb standing at wall 2x10  "ytb standing at wall 2x10 ytb standing at wall 2x10 ytb standing at wall   ER/IR                        Ther Ex        UBE 3/3 L0 3/3 L0 3/3 L0 3/3 L0 3/3 L0   Wrist flx/ext stretching 3x30\" ea 3x30\" ea 3x30\" ea 3x30\" ea 3x30\" ea   Tri ext with rope 2x10 15# 2x10 15# 2x10 15# 2x10 15# 2x10 20#   Wrist flex/ext weighted 2x10 ea 5# Resume NV Held (performed yesterday) 3x10 ea 5# 3x10 ea 8#   Radial dev 2x10 hammer Resume NV Held (performed yesterday) 3x10 hammer 3x10 hammer   Sup/pro therabar        Curls  2x10 10# handles chica 2x10 15# handes chica 2x10 20# handles chica 3x10 20# handles 3x10 20# handles   Upside down KB carries  2x3 laps 5# 2x3 laps 5# 2x3 laps 5# 2x3 laps 5# 5 laps 8#   90/90 carries Resume NV 5# DB 2x3 laps 5# DB 2x3 laps 2x3 laps 5# 5 laps 5#   Shoulder taps Resume NV 2x12 at table 2x12 at table 2x12 at table                    Ther Activity                        Gait Training                        Modalities                             "

## 2025-03-17 ENCOUNTER — APPOINTMENT (OUTPATIENT)
Dept: PHYSICAL THERAPY | Facility: REHABILITATION | Age: 45
End: 2025-03-17
Payer: COMMERCIAL

## 2025-03-18 ENCOUNTER — OFFICE VISIT (OUTPATIENT)
Dept: PHYSICAL THERAPY | Facility: REHABILITATION | Age: 45
End: 2025-03-18
Payer: COMMERCIAL

## 2025-03-18 DIAGNOSIS — M25.521 RIGHT ELBOW PAIN: Primary | ICD-10-CM

## 2025-03-18 PROCEDURE — 97112 NEUROMUSCULAR REEDUCATION: CPT

## 2025-03-18 PROCEDURE — 97110 THERAPEUTIC EXERCISES: CPT

## 2025-03-18 NOTE — PROGRESS NOTES
Daily Note     Today's date: 3/18/2025  Patient name: Chet Irene  : 1980  MRN: [id number]  Referring provider: Milton Escobedo, PT  Dx:   Encounter Diagnosis     ICD-10-CM    1. Right elbow pain  M25.521                 Start Time: 1745  Stop Time: 1830  Total time in clinic (min): 45 minutes    Subjective: Holger reports mild soreness upon arrival today.      Objective: See treatment diary below      Assessment: Tolerated treatment well. Continued with progressions of existing interventions as charted below with good tolerance. High soreness was present with the addition of BR curls with fat , likely attributable to ongoing weakness. Added face pulls to cont to address postural stability and awareness. We will continue to progress as tolerated. Patient would benefit from continued PT      Plan: Continue per plan of care.      Precautions:   Past Medical History:   Diagnosis Date    Asthma        Allergies   Allergen Reactions    Penicillins      Unknown, childhood allergy       POC expires Unit limit Auth Expiration date PT/OT + Visit Limit?   12 weeks - 2025 bomn No auth 30         Visit/Unit Tracking  AUTH Status:  Date 2/6 2/10 2/17 2/20 2/25 2/27 3/4 3/6 3/11 3/13 3/18   No auth Used 1 2 3 4 5 6 7 8 9 10 11    Remaining                  Pertinent Findings:      POC End Date: 2025                                                                                    Test / Measure     FOTO (Predicted 73) 51     118# L, 65# R   Wrist ext 4+/5   (+) cozens/mills    Poor posture          Access Code: [email or web address]  URL: [email or web address]  Date: 02/10/2025  Prepared by: Milton Escobedo    Exercises  - Wrist Flexion with Resistance  - 1 x daily - 3 x weekly - 3 sets - 10 reps  - Wrist Extension with Resistance  - 1 x daily - 3 x weekly - 3 sets - 10 reps    Manuals 2/27 3/4 3/6 3/11 3/13 3/18   R FA IASTM CM 10' CM 10' CM 10' CM 10' CM 10' CM 10'                              Neuro Re-Ed   "       HEP review/pt education         Row  2x10 20# 2x10 20# 2x10 20# 2x10 22# 2x10 22# 2x15 25#   LPD 2x10 15# 2x10 15# 2x10 15# 2x10 15# 2x10 15# 2x10 15#   Face pull      2x10 15#   Serratus wall slide         Hor abd 2x10 ytb standing at wall 2x10 ytb standing at wall 2x10 ytb standing at wall 2x10 ytb standing at wall 2x10 ytb standing at wall 2x10 ytb standing at wall   ER/IR                           Ther Ex         UBE 3/3 L0 3/3 L0 3/3 L0 3/3 L0 3/3 L0 3/3 L3   Wrist flx/ext stretching 3x30\" ea 3x30\" ea 3x30\" ea 3x30\" ea 3x30\" ea 3x30\" ea   Tri ext with rope 2x10 15# 2x10 15# 2x10 15# 2x10 15# 2x10 20# 2x10 20#   Wrist flex/ext weighted 2x10 ea 5# Resume NV Held (performed yesterday) 3x10 ea 5# 3x10 ea 8# HEP   Radial dev 2x10 hammer Resume NV Held (performed yesterday) 3x10 hammer 3x10 hammer HEP   Sup/pro therabar         Curls  2x10 10# handles chica 2x10 15# handes chica 2x10 20# handles chica 3x10 20# handles 3x10 20# handles 3x10 22# handles   Upside down KB carries  2x3 laps 5# 2x3 laps 5# 2x3 laps 5# 2x3 laps 5# 5 laps 8# 5 laps x 2 5#   90/90 carries Resume NV 5# DB 2x3 laps 5# DB 2x3 laps 2x3 laps 5# 5 laps 5# 5 laps x 2 5#   Shoulder taps Resume NV 2x12 at table 2x12 at table 2x12 at table      curl carries      5 laps 12#   BR curls with fat       2x10 8#                     Ther Activity                           Gait Training                           Modalities                                  "

## 2025-03-20 ENCOUNTER — OFFICE VISIT (OUTPATIENT)
Dept: PHYSICAL THERAPY | Facility: REHABILITATION | Age: 45
End: 2025-03-20
Payer: COMMERCIAL

## 2025-03-20 DIAGNOSIS — M25.521 RIGHT ELBOW PAIN: Primary | ICD-10-CM

## 2025-03-20 PROCEDURE — 97140 MANUAL THERAPY 1/> REGIONS: CPT

## 2025-03-20 PROCEDURE — 97110 THERAPEUTIC EXERCISES: CPT

## 2025-03-20 PROCEDURE — 97112 NEUROMUSCULAR REEDUCATION: CPT

## 2025-03-20 NOTE — PROGRESS NOTES
Daily Note     Today's date: 3/20/2025  Patient name: Chet Irene  : 1980  MRN: [id number]  Referring provider: Milton Escobedo, PT  Dx:   Encounter Diagnosis     ICD-10-CM    1. Right elbow pain  M25.521                 Start Time: 1740  Stop Time:   Total time in clinic (min): 38 minutes    Subjective: Holger reports mild soreness upon arrival today.      Objective: See treatment diary below      Assessment: Tolerated treatment well. Soreness present with resisted wrist flx/ext, asymptomatic otherwise. Will assess response and progress as tolerated. Patient would benefit from continued PT      Plan: Continue per plan of care.      Precautions:   Past Medical History:   Diagnosis Date    Asthma        Allergies   Allergen Reactions    Penicillins      Unknown, childhood allergy       POC expires Unit limit Auth Expiration date PT/OT + Visit Limit?   12 weeks - 2025 bomn No auth 30         Visit/Unit Tracking  AUTH Status:  Date 2/6 2/10 2/17 2/20 2/25 2/27 3/4 3/6 3/11 3/13 3/18 3/20   No auth Used 1 2 3 4 5 6 7 8 9 10 11 12    Remaining                   Pertinent Findings:      POC End Date: 2025                                                                                    Test / Measure     FOTO (Predicted 73) 51     118# L, 65# R   Wrist ext 4+/5   (+) cozens/mills    Poor posture          Access Code: [email or web address]  URL: [email or web address]  Date: 02/10/2025  Prepared by: Milton Escobedo    Exercises  - Wrist Flexion with Resistance  - 1 x daily - 3 x weekly - 3 sets - 10 reps  - Wrist Extension with Resistance  - 1 x daily - 3 x weekly - 3 sets - 10 reps    Manuals 2/27 3/4 3/6 3/11 3/13 3/18 3/20   R FA IASTM CM 10' CM 10' CM 10' CM 10' CM 10' CM 10' CM 10'                                 Neuro Re-Ed          HEP review/pt education          Row  2x10 20# 2x10 20# 2x10 20# 2x10 22# 2x10 22# 2x15 25# 2x15 25#   LPD 2x10 15# 2x10 15# 2x10 15# 2x10 15# 2x10 15# 2x10 15# 2x15  "20#   Face pull      2x10 15# 2x10 15#   Serratus wall slide          Hor abd 2x10 ytb standing at wall 2x10 ytb standing at wall 2x10 ytb standing at wall 2x10 ytb standing at wall 2x10 ytb standing at wall 2x10 ytb standing at wall    ER/IR                              Ther Ex          UBE 3/3 L0 3/3 L0 3/3 L0 3/3 L0 3/3 L0 3/3 L3 3/3 L3   Wrist flx/ext stretching 3x30\" ea 3x30\" ea 3x30\" ea 3x30\" ea 3x30\" ea 3x30\" ea 3x30\" ea   Tri ext with rope 2x10 15# 2x10 15# 2x10 15# 2x10 15# 2x10 20# 2x10 20# 2x15 25#   Wrist flex/ext weighted 2x10 ea 5# Resume NV Held (performed yesterday) 3x10 ea 5# 3x10 ea 8# HEP 3x10 ea 8#   Radial dev 2x10 hammer Resume NV Held (performed yesterday) 3x10 hammer 3x10 hammer HEP    Sup/pro therabar          Curls  2x10 10# handles chica 2x10 15# handes chica 2x10 20# handles chica 3x10 20# handles 3x10 20# handles 3x10 22# handles 3x10 25# handles   Upside down KB carries  2x3 laps 5# 2x3 laps 5# 2x3 laps 5# 2x3 laps 5# 5 laps 8# 5 laps x 2 5#    90/90 carries Resume NV 5# DB 2x3 laps 5# DB 2x3 laps 2x3 laps 5# 5 laps 5# 5 laps x 2 5# 5 laps x2 5#   Shoulder taps Resume NV 2x12 at table 2x12 at table 2x12 at table       curl carries      5 laps 12# 5 laps 12#   BR curls with fat       2x10 8# NV                       Ther Activity                              Gait Training                              Modalities                                   "

## 2025-03-24 ENCOUNTER — OFFICE VISIT (OUTPATIENT)
Dept: PHYSICAL THERAPY | Facility: REHABILITATION | Age: 45
End: 2025-03-24
Payer: COMMERCIAL

## 2025-03-24 DIAGNOSIS — M25.521 RIGHT ELBOW PAIN: Primary | ICD-10-CM

## 2025-03-24 PROCEDURE — 97112 NEUROMUSCULAR REEDUCATION: CPT

## 2025-03-24 PROCEDURE — 97110 THERAPEUTIC EXERCISES: CPT

## 2025-03-24 PROCEDURE — 97140 MANUAL THERAPY 1/> REGIONS: CPT

## 2025-03-24 NOTE — PROGRESS NOTES
Daily Note     Today's date: 3/24/2025  Patient name: Chet Irene  : 1980  MRN: [id number]  Referring provider: Milton Escobedo, PT  Dx:   Encounter Diagnosis     ICD-10-CM    1. Right elbow pain  M25.521                 Start Time:   Stop Time:   Total time in clinic (min): 38 minutes    Subjective: Holger reports mild soreness upon arrival today.      Objective: See treatment diary below    :  80# R  110# L    Assessment: Tolerated treatment well. Sx reproduced with BR curls and resisted gripping, not present otherwise. Will progress as tolerated with a focus on strength and postural stability and control. Patient would benefit from continued PT      Plan: Continue per plan of care.      Precautions:   Past Medical History:   Diagnosis Date    Asthma        Allergies   Allergen Reactions    Penicillins      Unknown, childhood allergy       POC expires Unit limit Auth Expiration date PT/OT + Visit Limit?   12 weeks - 2025 bomn No auth 30         Visit/Unit Tracking  AUTH Status:  Date 2/6 2/10 2/17 2/20 2/25 2/27 3/4 3/6 3/11 3/13 3/18 3/20 3/24   No auth Used 1 2 3 4 5 6 7 8 9 10 11 12 13    Remaining                    Pertinent Findings:      POC End Date: 2025                                                                                    Test / Measure     FOTO (Predicted 73) 51     118# L, 65# R   Wrist ext 4+/5   (+) cozens/mills    Poor posture          Access Code: [email or web address]  URL: [email or web address]  Date: 02/10/2025  Prepared by: Milton Escobedo    Exercises  - Wrist Flexion with Resistance  - 1 x daily - 3 x weekly - 3 sets - 10 reps  - Wrist Extension with Resistance  - 1 x daily - 3 x weekly - 3 sets - 10 reps    Manuals 2/27 3/4 3/6 3/11 3/13 3/18 3/20 3/24   R FA IASTM CM 10' CM 10' CM 10' CM 10' CM 10' CM 10' CM 10' CM 10'                                    Neuro Re-Ed           HEP review/pt education           Row  2x10 20# 2x10 20# 2x10 20# 2x10 22#  "2x10 22# 2x15 25# 2x15 25# 2x15 25#   LPD 2x10 15# 2x10 15# 2x10 15# 2x10 15# 2x10 15# 2x10 15# 2x15 20# 2x15 20#   Face pull      2x10 15# 2x10 15# 2x12 15#   Serratus wall slide           Hor abd 2x10 ytb standing at wall 2x10 ytb standing at wall 2x10 ytb standing at wall 2x10 ytb standing at wall 2x10 ytb standing at wall 2x10 ytb standing at wall     ER/IR                                 Ther Ex           UBE 3/3 L0 3/3 L0 3/3 L0 3/3 L0 3/3 L0 3/3 L3 3/3 L3 3/3 L3   Wrist flx/ext stretching 3x30\" ea 3x30\" ea 3x30\" ea 3x30\" ea 3x30\" ea 3x30\" ea 3x30\" ea 3x30\" ea   Tri ext with rope 2x10 15# 2x10 15# 2x10 15# 2x10 15# 2x10 20# 2x10 20# 2x15 25# 2x15 25#   Wrist flex/ext weighted 2x10 ea 5# Resume NV Held (performed yesterday) 3x10 ea 5# 3x10 ea 8# HEP 3x10 ea 8# 3x10 ea 8#   Radial dev 2x10 hammer Resume NV Held (performed yesterday) 3x10 hammer 3x10 hammer HEP     Sup/pro therabar           Curls  2x10 10# handles chica 2x10 15# handes chica 2x10 20# handles chica 3x10 20# handles 3x10 20# handles 3x10 22# handles 3x10 25# handles 3x10 25# handles   Upside down KB carries  2x3 laps 5# 2x3 laps 5# 2x3 laps 5# 2x3 laps 5# 5 laps 8# 5 laps x 2 5#     90/90 carries Resume NV 5# DB 2x3 laps 5# DB 2x3 laps 2x3 laps 5# 5 laps 5# 5 laps x 2 5# 5 laps x2 5# 5 laps x2    Shoulder taps Resume NV 2x12 at table 2x12 at table 2x12 at table        curl carries      5 laps 12# 5 laps 12# 3 laps x2 12#   BR curls with fat       2x10 8# NV 2x10 10#                         Ther Activity                                 Gait Training                                 Modalities                                      "

## 2025-03-31 ENCOUNTER — OFFICE VISIT (OUTPATIENT)
Dept: PHYSICAL THERAPY | Facility: REHABILITATION | Age: 45
End: 2025-03-31
Payer: COMMERCIAL

## 2025-03-31 DIAGNOSIS — M25.521 RIGHT ELBOW PAIN: Primary | ICD-10-CM

## 2025-03-31 PROCEDURE — 97112 NEUROMUSCULAR REEDUCATION: CPT

## 2025-03-31 PROCEDURE — 97110 THERAPEUTIC EXERCISES: CPT

## 2025-03-31 NOTE — PROGRESS NOTES
PT Evaluation     Today's date: 3/31/2025  Patient name: Chet Irene  : 1980  MRN: [id number]  Referring provider: Milton Escobedo, PT  Dx:   Encounter Diagnosis     ICD-10-CM    1. Right elbow pain  M25.521           Start Time:   Stop Time:   Total time in clinic (min): 45 minutes    Assessment  Impairments: abnormal or restricted ROM, activity intolerance, impaired physical strength, lacks appropriate home exercise program, pain with function and poor posture     Assessment details: Chet Irene is a pleasant 44 y.o. male who presents with R elbow pain. Upon eval today, he demonstrates s/sx consistent with a dx of R elbow tendonitis, demonstrating (+) special testing, poor R  strength, and limited R wrist ext strength resulting in difficulty performing work duties.  No further referral appears necessary at this time based upon examination results.  I expect he will improve within 4 weeks of skilled PT to improve FA mm extensibility and aforementioned deficits of strength.  Positive prognostic indicators include positive attitude toward recovery, good understanding of diagnosis and treatment plan options, acuity of symptoms, absence of peripheralization, and absence of observed red flags.  Negative prognostic indicators include obesity.      Comparable signs:  1)   2) Cozens/fuller    Problem List:  1) Poor  strength  2) Poor wrist ext strength  3) Poor FA tissue extensibility    3/31/25: Upon re-evaluation today, Holger has demonstrated improvements in UE strength, as well as minor reports of reductions in symptoms. Holger still presents with deficits of poor R shoulder, elbow and wrist strength, resulting in pain with ADLs. Neural tension was present previously with sx reproduction present, but none was noted today. There were no s/sx indicative of c/s involvement and I believe his ongoing sx to be solely attributable to ongoing weakness. His sx are most reproduced with resisted wrist  ext/elbow flexion with neutral FA alignment and  strength testing. They are not reproducible otherwise. Progressed UE strengthening interventions again today with good tolerance. We will continue to progress as tolerated.    Understanding of Dx/Px/POC: excellent     Prognosis: excellent    Goals  Impairment based goals  Patient will improve FA strength to 5/5 in all planes by time of d/c in order to improve ease and stability with functional mobility. - not met  Patient will improve R  strength to >/= L  strength by time of d/c in order to improve ease and stability with functional mobility. - not met  Patient will demonstrate (-) cozen's/mills testing. - not met  Patient will report 50% reduction in pain within 3 weeks. - not met  Patient will tolerate a treatment session centered around addressing deficits of strength and mobility with min c/o pain. - not met      Functional based goals to be completed by time of d/c  Patient will report min FA sx at work. - part met  Patient will improve FOTO to greater than predicted value. - not met  Patient will be independent with home exercise program. - not met  Patient will be able to manage symptoms independently. - not met    Plan    Planned therapy interventions: abdominal trunk stabilization, activity modification, ADL training, balance/weight bearing training, gait training, home exercise program, therapeutic exercise, therapeutic activities, stretching, strengthening, patient education, neuromuscular re-education, postural training, therapeutic training, joint mobilization, IASTM, kinesiology taping, manual therapy, massage, Joshua taping, motor coordination training, muscle pump exercises, nerve gliding, self care, work reintegration, fine motor coordination training, flexibility, functional ROM exercises, graded activity, graded exercise, graded motor, IADL retraining, balance, behavior modification, body mechanics training, breathing training,  transfer training and coordination    Frequency: 2x week  Duration in weeks: 12  Treatment plan discussed with: patient        Subjective Evaluation    History of Present Illness  Mechanism of injury: 2025: Chet Irene is a pleasant 44 y.o. male presenting to PT for insidious onset R elbow pain. He reports onset ~1 mo ago. Denies any known TRUNG but states his sx worsened following having to shovel.     3/31/25: Holger presents having completed 13 sessions to date, denying any signif changes in his sx over this time.   Patient Goals  Patient goals for therapy: decreased pain and increased strength    Pain  Current pain ratin  At best pain ratin  At worst pain ratin  Location: medial/lateral elbow  Quality: burning  Relieving factors: medications  Exacerbated by: sup/pro, reaching, lifting.  Progression: worsening    Social Support    Employment status: working (works at computer all day)  Hand dominance: left          Objective  CERVICAL EXAM  Red flags: None  Gonsales: negative   Babinski: negative   Alar Ligament: negative   Sharp Dian: negative   Vertebral Artery Test: negative     SENSATION   LEFT RIGHT   C2-C3 Intact Intact   C4 Intact Intact   C5 Intact Intact   C6 Intact Intact   C7 Intact Intact   C8 Intact Intact   T1 Intact Intact   T2 Intact Intact     REFLEXES   LEFT RIGHT   TRICEPS 2+ 2+   BICEPS 2+ 2+   BR 2+ 2+     POSTURAL FINDINGS  Fwd head, rounded shoulders, hyperkyphosis    CERVICAL   AROM all WNL    RIGHT LEFT   FLX    EXT    ROT           SHOULDER   AROM PROM STRENGTH    RIGHT LEFT RIGHT LEFT RIGHT LEFT   FLX     5/5 5/5   ABD     5/5 5/5   EXT     5/5 5/5   ER     5/5 5/5   IR     5/5 5/5   U. TRAP     5/5 5/5   M. TRAP     4+/5 4+/5   L. TRAP     4+/5 4+/5   PROTRACTION     4+/5 4+/5       ELBOW   AROM all WFL PROM STRENGTH    RIGHT LEFT RIGHT LEFT RIGHT LEFT   FLX     5/5 wrist sup, 4+/5 wrist neutral/pro 5/5   EXT     5/5 5/5   SUP     4+/5 5/5   PRO     5/5 5/5          "       WRIST   AROM all WFL PROM STRENGTH    RIGHT LEFT RIGHT LEFT RIGHT LEFT   FLX     5/5 5/5   EXT     4+/5 5/5       SPECIAL   LEFT RIGHT   LIGAMENTOUS     Valgus stress  neg   Varus stress  neg   EPICONDYLITIS     Cozen's  pos   Maudsley's     Mill's  pos   Medial Epicondylitis     NEUROLOGICAL     Elbow flexion test     Pinch      Tinel's      ULTT 1     ULTT 2     ULTT 3     ULTT 4     CARPAL TUNNEL     Carpal compression     TInel's     Phalen's       COMMENTS:    L: 108#  R: 95#         Precautions:   Past Medical History:   Diagnosis Date    Asthma        Allergies   Allergen Reactions    Penicillins      Unknown, childhood allergy       POC expires Unit limit Auth Expiration date PT/OT + Visit Limit?   12 weeks - 5/1/2025 bomn No auth 30             Visit/Unit Tracking  AUTH Status:  Date 3/13 3/18 3/20 3/24 3/31 (RE)   No auth Used 10 11 12 13 14     Remaining                Pertinent Findings:      POC End Date: 5/1/2025                                                                                    Test / Measure      FOTO (Predicted 73) 51     118# L, 65# R   Wrist ext 4+/5   (+) cozens/mills     Poor posture              Access Code: [email or web address]  URL: [email or web address]  Date: 02/10/2025  Prepared by: Milton Escobedo     Exercises  - Wrist Flexion with Resistance  - 1 x daily - 3 x weekly - 3 sets - 10 reps  - Wrist Extension with Resistance  - 1 x daily - 3 x weekly - 3 sets - 10 reps     Manuals 3/31   R FA IASTM                   Neuro Re-Ed    HEP review/pt education 10'   Row     LPD    Face pull 2x12 15#   Serratus wall slide NV   Hor abd NV   ER/IR              Ther Ex    UBE 3/3 L3   Wrist flx/ext    Wrist flx/ext stretching    Tri ext with rope 2x15 25#   Supinated curl 2x10 25#   Neutral curl 2x10 15#   Pronated curl 2x10 12#    carries 2x5 laps 20# DB   Truck drivers 3x30\" 10# plate   Upside down KB holds                          Ther Activity            "   Gait Training              Modalities

## 2025-04-03 ENCOUNTER — OFFICE VISIT (OUTPATIENT)
Dept: PHYSICAL THERAPY | Facility: REHABILITATION | Age: 45
End: 2025-04-03
Payer: COMMERCIAL

## 2025-04-03 DIAGNOSIS — M25.521 RIGHT ELBOW PAIN: Primary | ICD-10-CM

## 2025-04-03 PROCEDURE — 97110 THERAPEUTIC EXERCISES: CPT

## 2025-04-03 NOTE — PROGRESS NOTES
Daily Note     Today's date: 4/3/2025  Patient name: Chet Irene  : 1980  MRN: [id number]  Referring provider: Milton Escobedo, PT  Dx:   Encounter Diagnosis     ICD-10-CM    1. Right elbow pain  M25.521           Start Time:   Stop Time:   Total time in clinic (min): 42 minutes    Subjective: Holger reports sx improvement, denies any upon arrival today.      Objective: See treatment diary below      Assessment: Tolerated treatment well. Continued with progressions of existing interventions as charted below with good tolerance. Patient continues to be appropriately challenged at the current therapeutic volume and intensity. We will continue to progress as tolerated. Patient would benefit from continued PT      Plan: Continue per plan of care.      Precautions:   Past Medical History:   Diagnosis Date    Asthma        Allergies   Allergen Reactions    Penicillins      Unknown, childhood allergy       POC expires Unit limit Auth Expiration date PT/OT + Visit Limit?   12 weeks - 2025 bomn No auth 30             Visit/Unit Tracking  AUTH Status:  Date 3/13 3/18 3/20 3/24 3/31 (RE) 4/3   No auth Used 10 11 12 13 14 15     Remaining                 Pertinent Findings:      POC End Date: 2025                                                                                    Test / Measure      FOTO (Predicted 73) 51     118# L, 65# R   Wrist ext 4+/5   (+) cozens/mills     Poor posture              Access Code: [email or web address]  URL: [email or web address]  Date: 02/10/2025  Prepared by: Milton Escobedo     Exercises  - Wrist Flexion with Resistance  - 1 x daily - 3 x weekly - 3 sets - 10 reps  - Wrist Extension with Resistance  - 1 x daily - 3 x weekly - 3 sets - 10 reps     Manuals 3/31 4/3   R FA IASTM                       Neuro Re-Ed     HEP review/pt education 10'    Row      LPD     Face pull 2x12 15# 2x12 15#   Serratus wall slide NV    Hor abd NV    ER/IR                 Ther Ex    "  UBE 3/3 L3 3/3 L3   Wrist flx/ext     Wrist flx/ext stretching     Tri ext with rope 2x15 25# 2x15 25#   Supinated curl 2x10 25# 2x15 25#   Neutral curl 2x10 15# 2x10 15#   Pronated curl 2x10 12# 2x10 12#    carries 2x5 laps 20# DB 2x5 laps 20# DB   Truck drivers 3x30\" 10# plate 3x30\" 10# plate   Upside down KB holds                                Ther Activity                 Gait Training                 Modalities                           "

## 2025-04-07 ENCOUNTER — OFFICE VISIT (OUTPATIENT)
Dept: PHYSICAL THERAPY | Facility: REHABILITATION | Age: 45
End: 2025-04-07
Payer: COMMERCIAL

## 2025-04-07 DIAGNOSIS — M25.521 RIGHT ELBOW PAIN: Primary | ICD-10-CM

## 2025-04-07 PROCEDURE — 97112 NEUROMUSCULAR REEDUCATION: CPT

## 2025-04-07 PROCEDURE — 97110 THERAPEUTIC EXERCISES: CPT

## 2025-04-07 NOTE — PROGRESS NOTES
Discharge Summary    Today's date: 2025  Patient name: Chet Irene  : 1980  MRN: [id number]  Referring provider: Milton Escobedo PT  Dx:   Encounter Diagnosis     ICD-10-CM    1. Right elbow pain  M25.521             Start Time:   Stop Time:   Total time in clinic (min): 23 minutes    Subjective: Holger reports sx improvement, denies any upon arrival today. Wishes to make today his last day as he is compliant and confident with HEP.      Objective: See treatment diary below    Impairment based goals  Patient will improve FA strength to 5/5 in all planes by time of d/c in order to improve ease and stability with functional mobility. - not met  Patient will improve R  strength to >/= L  strength by time of d/c in order to improve ease and stability with functional mobility. - not met  Patient will demonstrate (-) cozen's/mills testing. - not met  Patient will report 50% reduction in pain within 3 weeks. - not met  Patient will tolerate a treatment session centered around addressing deficits of strength and mobility with min c/o pain. - not met        Functional based goals to be completed by time of d/c  Patient will report min FA sx at work. - part met  Patient will improve FOTO to greater than predicted value. - not met  Patient will be independent with home exercise program. - not met  Patient will be able to manage symptoms independently. - not met    Assessment: Tolerated treatment well. Patient is able to manage all sx independently and demonstrates good understanding of and proper form with each of the exercises included in HEP. Patient has met all personal and established goals for PT and is deemed safe for d/c. POC to remain open for 30 days and patient instructed to contact PT for any change in status. Prescribed and reviewed updated HEP. See below. Educated in progression. Patient verbalized understanding of the information provided. All patient questions were  answered.          Plan: Patient to be d/c from PT at this time.     Precautions:   Past Medical History:   Diagnosis Date    Asthma        Allergies   Allergen Reactions    Penicillins      Unknown, childhood allergy       POC expires Unit limit Auth Expiration date PT/OT + Visit Limit?   12 weeks - 5/1/2025 bomn No auth 30             Visit/Unit Tracking  AUTH Status:  Date 3/13 3/18 3/20 3/24 3/31 (RE) 4/3 4/7   No auth Used 10 11 12 13 14 15 16     Remaining                  Pertinent Findings:      POC End Date: 5/1/2025                                                                                    Test / Measure      FOTO (Predicted 73) 51     118# L, 65# R   Wrist ext 4+/5   (+) cozens/mills     Poor posture              Access Code: [email or web address]  URL: [email or web address]  Date: 04/07/2025  Prepared by: Milton Escobedo    Exercises  - Standing Wrist Flexion Stretch  - 1 x daily - 7 x weekly - 3 sets - 30 seconds hold  - Standing Wrist Extension Stretch  - 1 x daily - 7 x weekly - 3 sets - 30 seconds hold  - Curls - Palms UP/ DOWN/ NEUTRAL  - 1 x daily - 3 x weekly - 3 sets - 8-12 reps  - Standing Tricep Extensions with Resistance  - 1 x daily - 3 x weekly - 3 sets - 8-12 reps  - Face Pulls  - 1 x daily - 3 x weekly - 2 sets - 15 reps  - Standing Shoulder Row with Anchored Resistance  - 1 x daily - 3 x weekly - 2 sets - 15 reps  - Shoulder extension with resistance - Neutral  - 1 x daily - 3 x weekly - 2 sets - 15 reps  - Putty Squeezes  - 1 x daily - 3 x weekly - 2 sets - 20-30 reps - 3 seconds hold  - Seated Wrist Flexion with Dumbbell  - 1 x daily - 3 x weekly - 3 sets - 10-15 reps  - Seated Wrist Extension with Dumbbell  - 1 x daily - 3 x weekly - 3 sets - 10-15 reps    Manuals 3/31 4/3 4/7   R FA IASTM                           Neuro Re-Ed      HEP review/pt education 10'  10'   Row       LPD      Face pull 2x12 15# 2x12 15#    Serratus wall slide NV     Hor abd NV     ER/IR                 "    Ther Ex      UBE 3/3 L3 3/3 L3 3/3 L3   Wrist flx/ext      Wrist flx/ext stretching      Tri ext with rope 2x15 25# 2x15 25# 2x15 25#   Supinated curl 2x10 25# 2x15 25# 2x15 25#   Neutral curl 2x10 15# 2x10 15# 2x15 15#   Pronated curl 2x10 12# 2x10 12# 2x15 12#    carries 2x5 laps 20# DB 2x5 laps 20# DB    Truck drivers 3x30\" 10# plate 3x30\" 10# plate    Upside down KB holds                                      Ther Activity                    Gait Training                    Modalities                              "

## 2025-04-10 ENCOUNTER — APPOINTMENT (OUTPATIENT)
Dept: PHYSICAL THERAPY | Facility: REHABILITATION | Age: 45
End: 2025-04-10
Payer: COMMERCIAL

## 2025-04-14 ENCOUNTER — APPOINTMENT (OUTPATIENT)
Dept: PHYSICAL THERAPY | Facility: REHABILITATION | Age: 45
End: 2025-04-14
Payer: COMMERCIAL

## 2025-04-17 ENCOUNTER — APPOINTMENT (OUTPATIENT)
Dept: PHYSICAL THERAPY | Facility: REHABILITATION | Age: 45
End: 2025-04-17
Payer: COMMERCIAL

## 2025-06-14 ENCOUNTER — APPOINTMENT (OUTPATIENT)
Dept: RADIOLOGY | Age: 45
End: 2025-06-14
Payer: COMMERCIAL

## 2025-06-14 DIAGNOSIS — M25.512 LEFT SHOULDER PAIN, UNSPECIFIED CHRONICITY: ICD-10-CM

## 2025-06-14 PROCEDURE — 73030 X-RAY EXAM OF SHOULDER: CPT

## 2025-06-18 ENCOUNTER — HOSPITAL ENCOUNTER (EMERGENCY)
Facility: HOSPITAL | Age: 45
Discharge: HOME | End: 2025-06-18
Attending: EMERGENCY MEDICINE | Admitting: EMERGENCY MEDICINE
Payer: COMMERCIAL

## 2025-06-18 ENCOUNTER — APPOINTMENT (OUTPATIENT)
Dept: RADIOLOGY | Age: 45
End: 2025-06-18
Payer: COMMERCIAL

## 2025-06-18 VITALS
TEMPERATURE: 98.6 F | SYSTOLIC BLOOD PRESSURE: 130 MMHG | WEIGHT: 315 LBS | OXYGEN SATURATION: 95 % | RESPIRATION RATE: 18 BRPM | DIASTOLIC BLOOD PRESSURE: 69 MMHG | HEART RATE: 87 BPM | BODY MASS INDEX: 44.4 KG/M2

## 2025-06-18 DIAGNOSIS — R07.1 PAINFUL RESPIRATION: ICD-10-CM

## 2025-06-18 DIAGNOSIS — J45.901 ASTHMA EXACERBATION: Primary | ICD-10-CM

## 2025-06-18 LAB
ALBUMIN SERPL BCG-MCNC: 4.2 G/DL (ref 3.5–5)
ALP SERPL-CCNC: 80 U/L (ref 34–104)
ALT SERPL W P-5'-P-CCNC: 29 U/L (ref 7–52)
ANION GAP SERPL CALCULATED.3IONS-SCNC: 5 MMOL/L (ref 4–13)
AST SERPL W P-5'-P-CCNC: 20 U/L (ref 13–39)
BASOPHILS # BLD AUTO: 0.06 THOUSANDS/ÂΜL (ref 0–0.1)
BASOPHILS NFR BLD AUTO: 1 % (ref 0–1)
BILIRUB SERPL-MCNC: 0.31 MG/DL (ref 0.2–1)
BUN SERPL-MCNC: 12 MG/DL (ref 5–25)
CALCIUM SERPL-MCNC: 9.2 MG/DL (ref 8.4–10.2)
CARDIAC TROPONIN I PNL SERPL HS: 3 NG/L (ref ?–50)
CHLORIDE SERPL-SCNC: 105 MMOL/L (ref 96–108)
CO2 SERPL-SCNC: 28 MMOL/L (ref 21–32)
CREAT SERPL-MCNC: 0.79 MG/DL (ref 0.6–1.3)
D DIMER PPP FEU-MCNC: <0.27 UG/ML FEU
EOSINOPHIL # BLD AUTO: 0.22 THOUSAND/ÂΜL (ref 0–0.61)
EOSINOPHIL NFR BLD AUTO: 2 % (ref 0–6)
ERYTHROCYTE [DISTWIDTH] IN BLOOD BY AUTOMATED COUNT: 13.2 % (ref 11.6–15.1)
GFR SERPL CREATININE-BSD FRML MDRD: 108 ML/MIN/1.73SQ M
GLUCOSE SERPL-MCNC: 88 MG/DL (ref 65–140)
HCT VFR BLD AUTO: 43.8 % (ref 36.5–49.3)
HGB BLD-MCNC: 14.6 G/DL (ref 12–17)
IMM GRANULOCYTES # BLD AUTO: 0.07 THOUSAND/UL (ref 0–0.2)
IMM GRANULOCYTES NFR BLD AUTO: 1 % (ref 0–2)
LACTATE SERPL-SCNC: 1.1 MMOL/L (ref 0.5–2)
LYMPHOCYTES # BLD AUTO: 1.74 THOUSANDS/ÂΜL (ref 0.6–4.47)
LYMPHOCYTES NFR BLD AUTO: 17 % (ref 14–44)
MCH RBC QN AUTO: 28 PG (ref 26.8–34.3)
MCHC RBC AUTO-ENTMCNC: 33.3 G/DL (ref 31.4–37.4)
MCV RBC AUTO: 84 FL (ref 82–98)
MONOCYTES # BLD AUTO: 0.82 THOUSAND/ÂΜL (ref 0.17–1.22)
MONOCYTES NFR BLD AUTO: 8 % (ref 4–12)
NEUTROPHILS # BLD AUTO: 7.33 THOUSANDS/ÂΜL (ref 1.85–7.62)
NEUTS SEG NFR BLD AUTO: 71 % (ref 43–75)
NRBC BLD AUTO-RTO: 0 /100 WBCS
PLATELET # BLD AUTO: 288 THOUSANDS/UL (ref 149–390)
PMV BLD AUTO: 9.6 FL (ref 8.9–12.7)
POTASSIUM SERPL-SCNC: 3.8 MMOL/L (ref 3.5–5.3)
PROT SERPL-MCNC: 7 G/DL (ref 6.4–8.4)
RBC # BLD AUTO: 5.21 MILLION/UL (ref 3.88–5.62)
SODIUM SERPL-SCNC: 138 MMOL/L (ref 135–147)
WBC # BLD AUTO: 10.24 THOUSAND/UL (ref 4.31–10.16)

## 2025-06-18 PROCEDURE — 99284 EMERGENCY DEPT VISIT MOD MDM: CPT

## 2025-06-18 PROCEDURE — 85025 COMPLETE CBC W/AUTO DIFF WBC: CPT

## 2025-06-18 PROCEDURE — 96361 HYDRATE IV INFUSION ADD-ON: CPT

## 2025-06-18 PROCEDURE — 85379 FIBRIN DEGRADATION QUANT: CPT

## 2025-06-18 PROCEDURE — 99285 EMERGENCY DEPT VISIT HI MDM: CPT

## 2025-06-18 PROCEDURE — 93005 ELECTROCARDIOGRAM TRACING: CPT

## 2025-06-18 PROCEDURE — 71046 X-RAY EXAM CHEST 2 VIEWS: CPT

## 2025-06-18 PROCEDURE — 83605 ASSAY OF LACTIC ACID: CPT

## 2025-06-18 PROCEDURE — 84484 ASSAY OF TROPONIN QUANT: CPT

## 2025-06-18 PROCEDURE — 94640 AIRWAY INHALATION TREATMENT: CPT

## 2025-06-18 PROCEDURE — 96360 HYDRATION IV INFUSION INIT: CPT

## 2025-06-18 PROCEDURE — 80053 COMPREHEN METABOLIC PANEL: CPT

## 2025-06-18 PROCEDURE — 36415 COLL VENOUS BLD VENIPUNCTURE: CPT

## 2025-06-18 RX ORDER — OMEGA-3S/DHA/EPA/FISH OIL/D3 300MG-1000
400 CAPSULE ORAL DAILY
COMMUNITY

## 2025-06-18 RX ORDER — PREDNISONE 20 MG/1
40 TABLET ORAL DAILY
Qty: 10 TABLET | Refills: 0 | Status: SHIPPED | OUTPATIENT
Start: 2025-06-18

## 2025-06-18 RX ORDER — AZITHROMYCIN 250 MG/1
TABLET, FILM COATED ORAL
Qty: 4 TABLET | Refills: 0 | Status: SHIPPED | OUTPATIENT
Start: 2025-06-18 | End: 2025-06-18 | Stop reason: CLARIF

## 2025-06-18 RX ORDER — MELOXICAM 15 MG/1
TABLET ORAL
COMMUNITY
Start: 2025-05-16

## 2025-06-18 RX ORDER — IPRATROPIUM BROMIDE AND ALBUTEROL SULFATE 2.5; .5 MG/3ML; MG/3ML
3 SOLUTION RESPIRATORY (INHALATION) ONCE
Status: COMPLETED | OUTPATIENT
Start: 2025-06-18 | End: 2025-06-18

## 2025-06-18 RX ADMIN — IPRATROPIUM BROMIDE AND ALBUTEROL SULFATE 3 ML: 2.5; .5 SOLUTION RESPIRATORY (INHALATION) at 21:13

## 2025-06-18 RX ADMIN — SODIUM CHLORIDE 1000 ML: 0.9 INJECTION, SOLUTION INTRAVENOUS at 21:23

## 2025-06-18 NOTE — Clinical Note
Chet Irene was seen and treated in our emergency department on 6/18/2025.    No restrictions            Diagnosis:     Chet  may return to work on return date.    He may return on this date: 06/20/2025         If you have any questions or concerns, please don't hesitate to call.      Alonso Anne PA-C    ______________________________           _______________          _______________  Hospital Representative                              Date                                Time

## 2025-06-18 NOTE — Clinical Note
Chet Irene was seen and treated in our emergency department on 6/18/2025.    No restrictions            Diagnosis:     Chet  may return to work on return date.    He may return on this date: 06/21/2025         If you have any questions or concerns, please don't hesitate to call.      Alonso Anne PA-C    ______________________________           _______________          _______________  Hospital Representative                              Date                                Time   Occupational Therapy  Patient not seen in therapy.     Unavailable due to request no therapy today.      Attempted to see patient for OT treatment, however, patient declining participation at this time stating \"I got sick this morning and have been having diarrhea. Can we maybe try a little later?\" Will re-attempt patient at later time as schedule allows.        OBJECTIVE                   Documented in the chart in the following areas: Assessment. Plan.      Therapy procedure time and total treatment time can be found documented on the Time Entry flowsheet

## 2025-06-19 LAB
ATRIAL RATE: 105 BPM
P AXIS: 53 DEGREES
PR INTERVAL: 186 MS
QRS AXIS: 14 DEGREES
QRSD INTERVAL: 90 MS
QT INTERVAL: 326 MS
QTC INTERVAL: 430 MS
T WAVE AXIS: 57 DEGREES
VENTRICULAR RATE: 105 BPM

## 2025-06-19 PROCEDURE — 93010 ELECTROCARDIOGRAM REPORT: CPT | Performed by: INTERNAL MEDICINE

## 2025-06-19 NOTE — DISCHARGE INSTRUCTIONS
Continue albuterol as prescribed as needed for shortness of breath.  Prednisone as prescribed for asthma exacerbation.  Increase fluids.  Follow up with PCP.  Return to ER with worsening shortness of breath, chest pain, fevers, or any other concerning symptoms.

## 2025-06-19 NOTE — ED PROVIDER NOTES
Time reflects when diagnosis was documented in both MDM as applicable and the Disposition within this note       Time User Action Codes Description Comment    6/18/2025 11:37 PM Alonso Anne Add [J45.901] Asthma exacerbation           ED Disposition       ED Disposition   Discharge    Condition   Stable    Date/Time   Wed Jun 18, 2025 11:38 PM    Comment   Chet Irene discharge to home/self care.                   Assessment & Plan       Medical Decision Making  45 year old male with a PMH of asthma presenting with intermittent shortness of breath since Saturday. He notes difficulty taking deep breaths. He has taken albuterol at home with some relief. He states he called his PCP who ordered him a chest xray and is waiting the results. At this time, he denies fevers, chills, congestion, chest pain, palpitations, cough, abdominal pain, NVD, urinary symptoms, paresthesias, weakness, or syncope. He was able to ambulate into the ED under his own power. No known sick contacts. On exam, patient resting comfortably, vitals stable. No acute findings on exam, lungs CTA b/l with normal air movement.     DDX including but not limited to: viral URI, asthma exacerbation, pneumonia, ACS, PE, bronchitis, pleural effusion    EKG sinus tachycardia at 105 bpm, no acute ischemic changes. Trop 3, HEART score 2. CXR NAD per my read in ED. D-Dimer negative. Doubt PE and life threatening cardiac etiology. Patient with improvement of breathing following duoneb. CBC, CMP reassuring. Lactate WNL. Doubt life threatening etiology. Tachycardia resolved in ED following NS bolus. Suspect viral URI vs mild asthma exacerbation. Will prescribe prednisone taper. Recommended continued use of albuterol q4-6h PRN for SOB. Advised Tylenol/Motrin for pain. Advised patient to follow up with PCP. Vitals stable throughout ED course, patient with improvement of symptoms, safe and stable for discharge. Prior to discharge, discharge instructions were  discussed with patient at bedside. Patient was provided both verbal and written instructions. Patient is understanding of the discharge instructions and is agreeable to plan of care. Return precautions were discussed with patient bedside, patient verbalized understanding of signs and symptoms that would necessitate return to the ED. All questions were answered. Patient was comfortable with the plan of care and discharged to home.         Problems Addressed:  Asthma exacerbation: acute illness or injury    Amount and/or Complexity of Data Reviewed  Labs: ordered.    Risk  Prescription drug management.        ED Course as of 06/19/25 0509   Wed Jun 18, 2025   2336 Improvement of symptoms with duoneb  Resolution of tachycardia following fluids       Medications   sodium chloride 0.9 % bolus 1,000 mL (0 mL Intravenous Stopped 6/18/25 2348)   ipratropium-albuterol (DUO-NEB) 0.5-2.5 mg/3 mL inhalation solution 3 mL (3 mL Nebulization Given 6/18/25 2113)       ED Risk Strat Scores   HEART Risk Score      Flowsheet Row Most Recent Value   Heart Score Risk Calculator    History 0 Filed at: 06/19/2025 0505   ECG 1 Filed at: 06/19/2025 0505   Age 0 Filed at: 06/19/2025 0505   Risk Factors 1 Filed at: 06/19/2025 0505   Troponin 0 Filed at: 06/19/2025 0505   HEART Score 2 Filed at: 06/19/2025 0505          HEART Risk Score      Flowsheet Row Most Recent Value   Heart Score Risk Calculator    History 0 Filed at: 06/19/2025 0505   ECG 1 Filed at: 06/19/2025 0505   Age 0 Filed at: 06/19/2025 0505   Risk Factors 1 Filed at: 06/19/2025 0505   Troponin 0 Filed at: 06/19/2025 0505   HEART Score 2 Filed at: 06/19/2025 0505                      No data recorded    PERC Rule for PE      Flowsheet Row Most Recent Value   PERC Rule for PE    Age >=50 0 Filed at: 06/19/2025 0505   HR >=100 1 Filed at: 06/19/2025 0505   O2 Sat on room air < 95% 0 Filed at: 06/19/2025 0505   History of PE or DVT 0 Filed at: 06/19/2025 0505   Recent trauma or  "surgery 0 Filed at: 06/19/2025 0505   Hemoptysis 0 Filed at: 06/19/2025 0505   Exogenous estrogen 0 Filed at: 06/19/2025 0505   Unilateral leg swelling 0 Filed at: 06/19/2025 0505   PERC Rule for PE Results 1 Filed at: 06/19/2025 0505                                History of Present Illness       Chief Complaint   Patient presents with    Shortness of Breath     Pt states he's had worsening SOB since last Saturday. Had a chest X-ray today at urgent care and is till waiting on results. After that visit he states that the SOB has gotten worse and that in lungs are \"burning\" and is getting harder to take deep breath. No chest pain, fever, nausea or vomiting.        Past Medical History[1]   Past Surgical History[2]   Family History[3]   Social History[4]   E-Cigarette/Vaping      E-Cigarette/Vaping Substances    Nicotine No     THC No     CBD No     Flavoring No     Other No     Unknown No       I have reviewed and agree with the history as documented.     45 year old male with a PMH of asthma presenting with intermittent shortness of breath since Saturday. He notes difficulty taking deep breaths. He has taken albuterol at home with some relief. He states he called his PCP who ordered him a chest xray and is waiting the results. At this time, he denies fevers, chills, congestion, chest pain, palpitations, cough, abdominal pain, NVD, urinary symptoms, paresthesias, weakness, or syncope. He was able to ambulate into the ED under his own power. No known sick contacts.       Shortness of Breath  Associated symptoms: no abdominal pain, no chest pain, no cough, no fever, no headaches, no neck pain, no sore throat and no vomiting        Review of Systems   Constitutional:  Negative for chills and fever.   HENT:  Negative for congestion and sore throat.    Eyes:  Negative for visual disturbance.   Respiratory:  Positive for shortness of breath. Negative for cough.    Cardiovascular:  Negative for chest pain. "   Gastrointestinal:  Negative for abdominal pain, diarrhea, nausea and vomiting.   Genitourinary:  Negative for dysuria and hematuria.   Musculoskeletal:  Negative for neck pain.   Neurological:  Negative for dizziness, syncope, weakness, light-headedness, numbness and headaches.   Psychiatric/Behavioral: Negative.             Objective       ED Triage Vitals   Temperature Pulse Blood Pressure Respirations SpO2 Patient Position - Orthostatic VS   06/18/25 2015 06/18/25 2015 06/18/25 2015 06/18/25 2015 06/18/25 2015 06/18/25 2015   98.6 °F (37 °C) (!) 109 140/87 18 97 % Lying      Temp Source Heart Rate Source BP Location FiO2 (%) Pain Score    06/18/25 2015 06/18/25 2130 06/18/25 2015 -- --    Oral Monitor Right arm        Vitals      Date and Time Temp Pulse SpO2 Resp BP Pain Score FACES Pain Rating User   06/18/25 2330 -- 87 95 % -- 130/69 -- -- KB   06/18/25 2300 -- 89 96 % 18 128/76 -- --    06/18/25 2230 -- 91 95 % 18 128/72 -- --    06/18/25 2200 -- 94 95 % 18 127/79 -- -- KB   06/18/25 2130 -- 104 94 % 18 126/77 -- --    06/18/25 2015 98.6 °F (37 °C) 109 97 % 18 140/87 -- -- JR            Physical Exam  Vitals reviewed.   Constitutional:       General: He is not in acute distress.     Appearance: Normal appearance. He is not ill-appearing, toxic-appearing or diaphoretic.   HENT:      Head: Normocephalic and atraumatic.      Nose: Nose normal.      Mouth/Throat:      Mouth: Mucous membranes are moist.      Pharynx: Oropharynx is clear.     Eyes:      Extraocular Movements: Extraocular movements intact.      Conjunctiva/sclera: Conjunctivae normal.       Cardiovascular:      Rate and Rhythm: Normal rate and regular rhythm.      Pulses: Normal pulses.      Heart sounds: Normal heart sounds. No murmur heard.  Pulmonary:      Effort: Pulmonary effort is normal. No tachypnea, accessory muscle usage or respiratory distress.      Breath sounds: Normal breath sounds. No decreased breath sounds, wheezing, rhonchi  or rales.   Abdominal:      General: Abdomen is flat. Bowel sounds are normal.      Palpations: Abdomen is soft.     Musculoskeletal:         General: Normal range of motion.      Cervical back: Normal range of motion.     Skin:     General: Skin is warm and dry.      Capillary Refill: Capillary refill takes less than 2 seconds.     Neurological:      General: No focal deficit present.      Mental Status: He is alert and oriented to person, place, and time. Mental status is at baseline.     Psychiatric:         Mood and Affect: Mood normal.         Behavior: Behavior normal.         Thought Content: Thought content normal.         Results Reviewed       Procedure Component Value Units Date/Time    D-Dimer [[id number]]  (Normal) Collected: 06/18/25 2123    Lab Status: Final result Specimen: Blood from Arm, Right Updated: 06/18/25 2201     D-Dimer, Quant <0.27 ug/ml FEU     HS Troponin 0hr (reflex protocol) [[id number]]  (Normal) Collected: 06/18/25 2123    Lab Status: Final result Specimen: Blood from Arm, Right Updated: 06/18/25 2152     hs TnI 0hr 3 ng/L     Lactic acid, plasma (w/reflex if result > 2.0) [[id number]]  (Normal) Collected: 06/18/25 2123    Lab Status: Final result Specimen: Blood from Arm, Right Updated: 06/18/25 2147     LACTIC ACID 1.1 mmol/L     Narrative:      Result may be elevated if tourniquet was used during collection.    Comprehensive metabolic panel [[id number]] Collected: 06/18/25 2123    Lab Status: Final result Specimen: Blood from Arm, Right Updated: 06/18/25 2147     Sodium 138 mmol/L      Potassium 3.8 mmol/L      Chloride 105 mmol/L      CO2 28 mmol/L      ANION GAP 5 mmol/L      BUN 12 mg/dL      Creatinine 0.79 mg/dL      Glucose 88 mg/dL      Calcium 9.2 mg/dL      AST 20 U/L      ALT 29 U/L      Alkaline Phosphatase 80 U/L      Total Protein 7.0 g/dL      Albumin 4.2 g/dL      Total Bilirubin 0.31 mg/dL      eGFR 108 ml/min/1.73sq m     Narrative:      National Kidney Disease  Foundation guidelines for Chronic Kidney Disease (CKD):     Stage 1 with normal or high GFR (GFR > 90 mL/min/1.73 square meters)    Stage 2 Mild CKD (GFR = 60-89 mL/min/1.73 square meters)    Stage 3A Moderate CKD (GFR = 45-59 mL/min/1.73 square meters)    Stage 3B Moderate CKD (GFR = 30-44 mL/min/1.73 square meters)    Stage 4 Severe CKD (GFR = 15-29 mL/min/1.73 square meters)    Stage 5 End Stage CKD (GFR <15 mL/min/1.73 square meters)  Note: GFR calculation is accurate only with a steady state creatinine    CBC and differential [[id number]]  (Abnormal) Collected: 06/18/25 2123    Lab Status: Final result Specimen: Blood from Arm, Right Updated: 06/18/25 2136     WBC 10.24 Thousand/uL      RBC 5.21 Million/uL      Hemoglobin 14.6 g/dL      Hematocrit 43.8 %      MCV 84 fL      MCH 28.0 pg      MCHC 33.3 g/dL      RDW 13.2 %      MPV 9.6 fL      Platelets 288 Thousands/uL      nRBC 0 /100 WBCs      Segmented % 71 %      Immature Grans % 1 %      Lymphocytes % 17 %      Monocytes % 8 %      Eosinophils Relative 2 %      Basophils Relative 1 %      Absolute Neutrophils 7.33 Thousands/µL      Absolute Immature Grans 0.07 Thousand/uL      Absolute Lymphocytes 1.74 Thousands/µL      Absolute Monocytes 0.82 Thousand/µL      Eosinophils Absolute 0.22 Thousand/µL      Basophils Absolute 0.06 Thousands/µL             No orders to display       ECG 12 Lead Documentation Only    Date/Time: 6/18/2025 8:45 PM    Performed by: Alonso Anne PA-C  Authorized by: Alonso Anne PA-C    Indications / Diagnosis:  SOB  ECG reviewed by me, the ED Provider: yes    Patient location:  ED  Previous ECG:     Previous ECG:  Compared to current    Similarity:  Changes noted  Interpretation:     Interpretation: non-specific    Rate:     ECG rate:  105    ECG rate assessment: tachycardic    Rhythm:     Rhythm: sinus tachycardia    Ectopy:     Ectopy: none    QRS:     QRS axis:  Normal    QRS intervals:  Normal  Conduction:     Conduction: normal     ST segments:     ST segments:  Normal  Comments:      Sinus tachycardia at 105 bpm, no acute ischemic changes.       ED Medication and Procedure Management   Prior to Admission Medications   Prescriptions Last Dose Informant Patient Reported? Taking?   Albuterol Sulfate (PROVENTIL HFA IN)   Yes Yes   cetirizine (ZyrTEC) 10 mg tablet 6/18/2025 at  7:30 AM Self Yes Yes   Sig: Take 10 mg by mouth in the morning.   cholecalciferol (VITAMIN D3) 400 units tablet 6/17/2025 at 11:00 PM  Yes Yes   Sig: Take 400 Units by mouth daily   diazepam (VALIUM) 5 mg tablet  Self Yes No   Sig: TAKE 1 TABLET BY MOUTH TWO TIMES DAILY AS NEEDED FOR MUSCLE SPASMS   hydrochlorothiazide (HYDRODIURIL) 12.5 mg tablet 6/18/2025 at  7:30 AM Self Yes Yes   Sig: Take 12.5 mg by mouth in the morning.   levofloxacin (LEVAQUIN) 500 mg tablet  Self Yes No   Sig: Take 500 mg by mouth daily   meloxicam (MOBIC) 15 mg tablet 6/18/2025 at  6:00 PM  Yes Yes   Sig: TAKE 1 TABLET BY MOUTH EVERY DAY WITH FOOD AS NEEDED FOR PAIN   multivitamin (THERAGRAN) TABS 6/17/2025 at 11:00 PM Self Yes Yes   Sig: Take 1 tablet by mouth in the morning.      Facility-Administered Medications: None     Discharge Medication List as of 6/18/2025 11:42 PM        START taking these medications    Details   predniSONE 20 mg tablet Take 2 tablets (40 mg total) by mouth daily, Starting Wed 6/18/2025, Normal           CONTINUE these medications which have NOT CHANGED    Details   Albuterol Sulfate (PROVENTIL HFA IN) Historical Med      cetirizine (ZyrTEC) 10 mg tablet Take 10 mg by mouth in the morning., Historical Med      cholecalciferol (VITAMIN D3) 400 units tablet Take 400 Units by mouth daily, Historical Med      hydrochlorothiazide (HYDRODIURIL) 12.5 mg tablet Take 12.5 mg by mouth in the morning., Historical Med      meloxicam (MOBIC) 15 mg tablet TAKE 1 TABLET BY MOUTH EVERY DAY WITH FOOD AS NEEDED FOR PAIN, Historical Med      multivitamin (THERAGRAN) TABS Take 1 tablet  by mouth in the morning., Historical Med      diazepam (VALIUM) 5 mg tablet TAKE 1 TABLET BY MOUTH TWO TIMES DAILY AS NEEDED FOR MUSCLE SPASMS, Historical Med      levofloxacin (LEVAQUIN) 500 mg tablet Take 500 mg by mouth daily, Starting Wed 2/23/2022, Historical Med           No discharge procedures on file.  ED SEPSIS DOCUMENTATION   Time reflects when diagnosis was documented in both MDM as applicable and the Disposition within this note       Time User Action Codes Description Comment    6/18/2025 11:37 PM Alonso Anne [J45.901] Asthma exacerbation                    [1]   Past Medical History:  Diagnosis Date    Asthma    [2]   Past Surgical History:  Procedure Laterality Date    [place] NDSC WRST SURG W/RLS TRANSVRS CARPL LIGM Right 10/12/2021    Procedure: Right endoscopic carpal tunnel release;  Surgeon: Darius Beckman MD;  Location: [facility];  Service: Orthopedics    [place] NDSC WRST SURG W/RLS TRANSVRS CARPL LIGM Left 10/26/2021    Procedure: RELEASE CARPAL TUNNEL ENDOSCOPIC;  Surgeon: Dairus Beckman MD;  Location: [facility];  Service: Orthopedics   [3] No family history on file.  [4]   Social History  Tobacco Use    Smoking status: Former    Smokeless tobacco: Never   Substance Use Topics    Alcohol use: Not Currently     Comment: rare    Drug use: No        Alonso Anne PA-C  06/19/25 4506

## 2025-07-01 ENCOUNTER — OFFICE VISIT (OUTPATIENT)
Dept: OBGYN CLINIC | Facility: CLINIC | Age: 45
End: 2025-07-01
Payer: COMMERCIAL

## 2025-07-01 VITALS — BODY MASS INDEX: 42.66 KG/M2 | HEIGHT: 72 IN | WEIGHT: 315 LBS

## 2025-07-01 DIAGNOSIS — M75.22 BICEPS TENDONITIS ON LEFT: Primary | ICD-10-CM

## 2025-07-01 DIAGNOSIS — M75.42 CORACOID IMPINGEMENT OF LEFT SHOULDER: ICD-10-CM

## 2025-07-01 DIAGNOSIS — S49.92XA INJURY OF GLENOID LABRUM, LEFT, INITIAL ENCOUNTER: ICD-10-CM

## 2025-07-01 PROBLEM — S49.90XA INJURY OF GLENOID LABRUM: Status: ACTIVE | Noted: 2025-07-01

## 2025-07-01 PROCEDURE — 99214 OFFICE O/P EST MOD 30 MIN: CPT | Performed by: ORTHOPAEDIC SURGERY

## 2025-07-01 NOTE — PROGRESS NOTES
Sports Medicine and Shoulder Surgery    Chet Irene, 45 y.o. male   MRN# [id number]   : 1980        Assessment & Plan  Biceps tendonitis on left  Injury of glenoid labrum, left, initial encounter  Coracoid impingement of left shoulder  Physical exam, diagnostic imaging, and subjective history are all most consistent with the above diagnosis. The pathoanatomy and natural history of this diagnosis was explained to the patient in detail.   Continue Physical Therapy as scheduled until discharged from their care, transition to home exercise program  Order placed today for MRI of the left shoulder to further evaluate for labral pathology  May use ice or heat as needed for pain relief  May take NSAIDs/Tylenol as needed for pain control  Follow up after MRI to review results and further delineate appropriate treatment  If any issues, questions, or concerns arise between now and the next appointment, we have encouraged the patient contact our team.    Orders:    MRI shoulder left wo contrast; Future         Chief Complaint:    Left Shoulder Pain and Dysfunction    Subjective:   Emerson is a very pleasant 45-year-old male who presents today for initial evaluation of left shoulder pain that has been ongoing since February, he had 1 flare of this pain in February that improved in a couple days, and subsequently over Memorial Day weekend while throwing baseball developed anterior/superior lateral shoulder pain with no improvement since onset.  He describes pain rated anywhere from 3-8/10 aggravated by sleeping with his arm elevated, reaching behind him. His PCP did refer him to PT which he plans to initiate next week. PCP also prescribed Meloxicam which is providing relief.     Physical Examination:  General:   Well-appearing  No acute distress  Appears stated age    Cervical Spine  No tenderness to palpation over the cervical spine in the midline or of the paraspinal muscles  Full range of motion without  pain  Negative spurlings   Negative Lhermitte test    left Shoulder  Negative tenderness to palpation over the acromioclavicular joint  Negative tenderness to palpation over the long head of the biceps tendon  Positive tenderness to palpation over the coracoid, short head of the bicep tendon  Shoulder effusion none present  Shoulder abduction 180/180  Shoulder forward flexion 180/180  Shoulder external rotation 50/50  Shoulder internal rotation T7/T7  Supraspinatus/ABD 5/5   Infraspinatus/ER 5/5   Subscapularis/IR 5/5   Negative Belly Press/SS  Negative Neer  Positive Barnes  Negative O'briens  Negative Apprehension  Negative Relocation  Negative Posterior Jerk  Negative Sulcus  Skin is intact with no erythema, warmth or drainage  Motor strength intact distally  Sensation to light touch is normal in the axillary, radial, ulnar, and median nerve distributions.  Fingers warm and perfused     Imaging Studies:  X-ray of the left shoulder obtained 6/14/25 reviewed demonstrating: no acute osseous abnormalities, no degenerative changes     Review of Systems:  General- denies fever/chills  HEENT- denies hearing loss or sore throat  Eyes- denies eye pain or visual disturbances, denies red eyes  Respiratory- denies cough or SOB  Cardio- denies chest pain or palpitations  GI- denies abdominal pain  Endocrine- denies urinary frequency  Urinary- denies pain with urination  Musculoskeletal- Negative except noted above  Skin- denies rashes or wounds  Neurological- denies dizziness or headache  Psychiatric- denies anxiety or difficulty concentrating      Allergies:  Allergies   Allergen Reactions    Penicillins      Unknown, childhood allergy       Medications:  Current Medications[1]    Past Medical History:  Past Medical History[2]     Past Surgical History:  Past Surgical History[3]     Family History:  Family History[4]     Social History:  Social History     Socioeconomic History    Marital status: /Civil Union      Spouse name: Not on file    Number of children: Not on file    Years of education: Not on file    Highest education level: Not on file   Occupational History    Not on file   Tobacco Use    Smoking status: Former    Smokeless tobacco: Never   Vaping Use    Vaping status: Not on file   Substance and Sexual Activity    Alcohol use: Not Currently     Comment: rare    Drug use: No    Sexual activity: Not on file   Other Topics Concern    Not on file   Social History Narrative    Not on file     Social Drivers of Health     Financial Resource Strain: Not on file   Food Insecurity: Not on file   Transportation Needs: Not on file   Physical Activity: Not on file   Stress: Not on file   Social Connections: Not on file   Intimate Partner Violence: Not on file   Housing Stability: Not on file         Objective:   Body mass index is 44.35 kg/m².   ---------------------------------------------------------------------  Kevyn Franks MD, PhD   Orthopedic Surgery, [facility]   Sports Medicine and Shoulder Surgery        Scribe Attestation      I,:  Madison Abbott am acting as a scribe while in the presence of the attending physician.:       I,:  Kevyn Franks MD personally performed the services described in this documentation    as scribed in my presence.:                         [1]   Current Outpatient Medications:     Albuterol Sulfate (PROVENTIL HFA IN), , Disp: , Rfl:     cetirizine (ZyrTEC) 10 mg tablet, Take 10 mg by mouth in the morning., Disp: , Rfl:     cholecalciferol (VITAMIN D3) 400 units tablet, Take 400 Units by mouth daily, Disp: , Rfl:     hydrochlorothiazide (HYDRODIURIL) 12.5 mg tablet, Take 12.5 mg by mouth in the morning., Disp: , Rfl:     meloxicam (MOBIC) 15 mg tablet, TAKE 1 TABLET BY MOUTH EVERY DAY WITH FOOD AS NEEDED FOR PAIN, Disp: , Rfl:     multivitamin (THERAGRAN) TABS, Take 1 tablet by mouth in the morning., Disp: , Rfl:     predniSONE 20 mg tablet, Take 2 tablets  (40 mg total) by mouth daily, Disp: 10 tablet, Rfl: 0    diazepam (VALIUM) 5 mg tablet, TAKE 1 TABLET BY MOUTH TWO TIMES DAILY AS NEEDED FOR MUSCLE SPASMS, Disp: , Rfl:     levofloxacin (LEVAQUIN) 500 mg tablet, Take 500 mg by mouth daily, Disp: , Rfl:   [2]   Past Medical History:  Diagnosis Date    Asthma    [3]   Past Surgical History:  Procedure Laterality Date    [place] NDSC WRST SURG W/RLS TRANSVRS CARPL LIGM Right 10/12/2021    Procedure: Right endoscopic carpal tunnel release;  Surgeon: Darius Beckman MD;  [facility];  Service: Orthopedics    [place] NDSC WRST SURG W/RLS TRANSVRS CARPL LIGM Left 10/26/2021    Procedure: RELEASE CARPAL TUNNEL ENDOSCOPIC;  Surgeon: Darius Beckman MD;  [facility];  Service: Orthopedics   [4] No family history on file.

## 2025-07-01 NOTE — ASSESSMENT & PLAN NOTE
Physical exam, diagnostic imaging, and subjective history are all most consistent with the above diagnosis. The pathoanatomy and natural history of this diagnosis was explained to the patient in detail.   Continue Physical Therapy as scheduled until discharged from their care, transition to home exercise program  Order placed today for MRI of the left shoulder to further evaluate for labral pathology  May use ice or heat as needed for pain relief  May take NSAIDs/Tylenol as needed for pain control  Follow up after MRI to review results and further delineate appropriate treatment  If any issues, questions, or concerns arise between now and the next appointment, we have encouraged the patient contact our team.    Orders:    MRI shoulder left wo contrast; Future

## 2025-07-08 ENCOUNTER — EVALUATION (OUTPATIENT)
Dept: PHYSICAL THERAPY | Facility: REHABILITATION | Age: 45
End: 2025-07-08
Payer: COMMERCIAL

## 2025-07-08 DIAGNOSIS — M75.22 BICIPITAL TENDINITIS OF LEFT SHOULDER: Primary | ICD-10-CM

## 2025-07-08 DIAGNOSIS — S49.90XA INJURY OF GLENOID LABRUM, INITIAL ENCOUNTER: ICD-10-CM

## 2025-07-08 DIAGNOSIS — M75.22 BICEPS TENDONITIS ON LEFT: Primary | ICD-10-CM

## 2025-07-08 DIAGNOSIS — M75.42 IMPINGEMENT SYNDROME OF LEFT SHOULDER: ICD-10-CM

## 2025-07-08 DIAGNOSIS — M75.42 CORACOID IMPINGEMENT OF LEFT SHOULDER: ICD-10-CM

## 2025-07-08 DIAGNOSIS — S49.92XA INJURY OF GLENOID LABRUM, LEFT, INITIAL ENCOUNTER: ICD-10-CM

## 2025-07-08 PROCEDURE — 97110 THERAPEUTIC EXERCISES: CPT

## 2025-07-08 PROCEDURE — 97161 PT EVAL LOW COMPLEX 20 MIN: CPT

## 2025-07-08 PROCEDURE — 97112 NEUROMUSCULAR REEDUCATION: CPT

## 2025-07-08 NOTE — TELEPHONE ENCOUNTER
MRI of the left shoulder was denied by insurance due to no history of PT. Dr. Franks would like the patient to try PT and if it does not help we can go from there. I spoke to the patient- he is aware and agreeable to this plan.

## 2025-07-08 NOTE — PROGRESS NOTES
PT Evaluation     Today's date: 2025  Patient name: Chet Irene  : 1980  MRN: [id number]  Referring provider: No ref. provider found  Dx:   Encounter Diagnosis     ICD-10-CM    1. Bicipital tendinitis of left shoulder  M75.22       2. Impingement syndrome of left shoulder  M75.42       3. Injury of glenoid labrum, initial encounter  S49.90XA           Start Time: 175  Stop Time: 0  Total time in clinic (min): 46 minutes    Assessment  Impairments: abnormal or restricted ROM, impaired physical strength, lacks appropriate home exercise program and pain with function    Assessment details: Chet Irene is a pleasant 45 y.o. male who presents with L shoulder pain. Upon eval today, he has poor OH endurance, poor 90/90 ER/IR strength and poor t/s mobility, resulting in worry over not knowing what's wrong and fear of not being able to keep active.  No further referral appears necessary at this time based upon examination results.  I expect he will improve with skilled strengthening, but if no sx improvement is exhibited within 4 weeks I believe further imaging may be warranted to rule out labral involvement.  Positive prognostic indicators include positive attitude toward recovery, good understanding of diagnosis and treatment plan options, acuity of symptoms, absence of peripheralization, and absence of observed red flags.  Negative prognostic indicators include multiple concurrent orthopedic problems and obesity.      Comparable signs:  1) 90/90 ER  2) T/s rot    Problem List:  1) Poor OH endurance  2) Poor 90/90 ER/IR strength  3) Poor t/s mobility  Understanding of Dx/Px/POC: excellent     Prognosis: excellent    Goals  Impairment based goals  Patient will improve B periscap strength to 4+/5 in all planes within 2 weeks in order to improve ease and stability with functional mobility.  Patient will improve B periscap strength to 5/5 in all planes by time of d/c in order to improve ease and  "stability with functional mobility.  Patient will improve t/s ROM by 10% within 3 weeks in order to improve ease and stability with functional mobility.  Patient will improve t/s ROM to WFL by time of d/c in order to improve ease and stability with functional mobility.  Patient will report 50% reduction in pain within 4 weeks.  Patient will tolerate a treatment session centered around addressing deficits of strength and mobility with min c/o pain.      Functional based goals to be completed by time of d/c  Patient will improve FOTO to greater than predicted value.  Patient will be independent with home exercise program.   Patient will be able to manage symptoms independently.     Plan    Planned therapy interventions: abdominal trunk stabilization, activity modification, ADL training, balance/weight bearing training, gait training, home exercise program, therapeutic exercise, therapeutic activities, stretching, strengthening, patient education, neuromuscular re-education, postural training, therapeutic training, joint mobilization, IASTM, kinesiology taping, manual therapy, massage, Joshua taping, motor coordination training, muscle pump exercises, nerve gliding, self care, work reintegration, fine motor coordination training, flexibility, functional ROM exercises, graded activity, graded exercise, graded motor, IADL retraining, balance, behavior modification, body mechanics training, breathing training, transfer training and coordination    Frequency: 2x week  Duration in weeks: 12  Treatment plan discussed with: patient        Subjective Evaluation    History of Present Illness  Mechanism of injury: 7/8/2025: Chet Irene is a pleasant 45 y.o. male presenting to PT for L shoulder pain. Reports initial onset MDW when throwing with his kids. Per visit with ortho:    \"· Physical exam, diagnostic imaging, and subjective history are all most consistent with the above diagnosis. The pathoanatomy and natural " "history of this diagnosis was explained to the patient in detail.   · Continue Physical Therapy as scheduled until discharged from their care, transition to home exercise program  · Order placed today for MRI of the left shoulder to further evaluate for labral pathology  · May use ice or heat as needed for pain relief  · May take NSAIDs/Tylenol as needed for pain control  · Follow up after MRI to review results and further delineate appropriate treatment  · If any issues, questions, or concerns arise between now and the next appointment, we have encouraged the patient contact our team.\"    Patient Goals  Patient goals for therapy: increased strength, independence with ADLs/IADLs, return to sport/leisure activities, increased motion and decreased pain    Pain  Current pain ratin  At best pain ratin  At worst pain ratin  Location: L anterior shoulder  Quality: sharp and tight  Alleviating factors: meloxicam.  Exacerbated by: halding arm in abd, reaching behind.  Progression: improved    Social Support    Employment status: working  Hand dominance: left          Objective  CERVICAL EXAM  Red flags: None  Gonsales: negative   Babinski: negative   Alar Ligament: negative   Sharp Dain: negative   Vertebral Artery Test: negative     SENSATION   LEFT RIGHT   C2-C3 Intact Intact   C4 Intact Intact   C5 Intact Intact   C6 Intact Intact   C7 Intact Intact   C8 Intact Intact   T1 Intact Intact   T2 Intact Intact     REFLEXES   LEFT RIGHT   TRICEPS 2+ 2+   BICEPS 2+ 2+   BR 2+ 2+     POSTURAL FINDINGS  Fwd head rounded shoulders    CERVICAL   AROM - all WFL    RIGHT LEFT   FLX    EXT    ROT         THORACIC   AROM    RIGHT LEFT   FLX 75%   EXT 50%   ROT 75% 75% P!       SHOULDER   AROM - all WFL PROM STRENGTH: _/5    RIGHT LEFT RIGHT LEFT RIGHT LEFT   FLX     5 5   ABD     5 5   EXT     5 5   ER C7 C7   5 5   IR T10 T10 P!   5 5   U. TRAP     5 5   M. TRAP     4+ 4+   L. TRAP     4+ 4+   SERRATUS     5 5   90/90 ER  "    4 4   90/90 IR     4 4       ELBOW   AROM PROM STRENGTH: _/5    RIGHT LEFT RIGHT LEFT RIGHT LEFT   FLX     5 5   EXT     5 5                  SPECIAL   LEFT RIGHT   RTC/IMPINGEMENT     Barnes pos pos   Infraspinatus     Painful arc     Drop arm     ER lag sign     Lift off sign     Neer     Empty can pos    BICEPS TENDINOPATHY     Speed's     Alfredrtamy's     Margie's     ACJ     Active compression     Crossover          SCAPULA     Scapular assistance     Scapular dyskinesis     TOS     Adson     Ernie Tolliveros     ULTT     ULTT 1     ULTT 2     ULTT 3     ULTT 4       COMMENTS:           Precautions: Past Medical History[1]    Allergies[2]    POC expires Unit limit Auth Expiration date PT/OT + Visit Limit?   12 weeks - 9/30/2025 bomn  30 (16 used)         Visit/Unit Tracking  AUTH Status:  Date 7/8         Used 1         Remaining  13           Pertinent Findings:      POC End Date: 9/30/2025                                                                                    Test / Measure     FOTO (Predicted 77) 62   Poor OH endurance    90/90 ER/IR 4/5 P!   Poor t/s mobility        Access Code: [email or web address]  URL: [email or web address]  Date: 07/08/2025  Prepared by: Milton Escobedo    Exercises  - Open Book with Leg on Pillow  - 1 x daily - 7 x weekly - 1 sets - 10 reps - 5-10 seconds hold  - Sleeper Stretch  - 1 x daily - 7 x weekly - 1 sets - 10 reps - 10 seconds hold  - Face Pulls  - 1 x daily - 3 x weekly - 3 sets - 10 reps  - Standing Shoulder Single Arm PNF D2 Flexion with Resistance  - 1 x daily - 3 x weekly - 3 sets - 10 reps  - Shoulder extension with resistance - Neutral  - 1 x daily - 3 x weekly - 3 sets - 10 reps      Manuals 7/8                                                                Neuro Re-Ed             HEP review/pt education 25'            Serratus wall slide             OH DB carries             RTC wall clock             Push up plus                             "           Ther Ex             UBE             Sleeper stretch 10x10\"            Open book 5x B 5\"            Row to ER 10x gtb            D2 flexion 10x gtb            LPD Reviewed HEP            Jones row                                                                 Ther Activity                                       Gait Training                                       Modalities                                                 [1]   Past Medical History:  Diagnosis Date    Asthma    [2]   Allergies  Allergen Reactions    Penicillins      Unknown, childhood allergy     "

## 2025-07-17 ENCOUNTER — OFFICE VISIT (OUTPATIENT)
Dept: PHYSICAL THERAPY | Facility: REHABILITATION | Age: 45
End: 2025-07-17
Payer: COMMERCIAL

## 2025-07-17 DIAGNOSIS — M75.42 IMPINGEMENT SYNDROME OF LEFT SHOULDER: ICD-10-CM

## 2025-07-17 DIAGNOSIS — M75.22 BICIPITAL TENDINITIS OF LEFT SHOULDER: Primary | ICD-10-CM

## 2025-07-17 DIAGNOSIS — S49.90XA INJURY OF GLENOID LABRUM, INITIAL ENCOUNTER: ICD-10-CM

## 2025-07-17 PROCEDURE — 97112 NEUROMUSCULAR REEDUCATION: CPT

## 2025-07-17 PROCEDURE — 97110 THERAPEUTIC EXERCISES: CPT

## 2025-07-17 NOTE — PROGRESS NOTES
Daily Note     Today's date: 2025  Patient name: Chet Irene  : 1980  MRN: [id number]  Referring provider: No ref. provider found  Dx:   Encounter Diagnosis     ICD-10-CM    1. Bicipital tendinitis of left shoulder  M75.22       2. Impingement syndrome of left shoulder  M75.42       3. Injury of glenoid labrum, initial encounter  S49.90XA           Start Time: 1738  Stop Time: 1809  Total time in clinic (min): 31 minutes    Subjective: Holger reports soreness with HEP completion.      Objective: See treatment diary below      Assessment: Tolerated treatment well. Initiated shoulder and periscapular strengthening with good tolerance. Soreness and fatigue present throughout but with good form present. Patient would benefit from continued PT      Plan: Continue per plan of care.      Precautions: Past Medical History[1]    Allergies[2]    POC expires Unit limit Auth Expiration date PT/OT + Visit Limit?   12 weeks - 2025 bomn  30 (16 used)         Visit/Unit Tracking  AUTH Status:  Date         Used 1 2        Remaining  13 12          Pertinent Findings:      POC End Date: 2025                                                                                    Test / Measure     FOTO (Predicted 77) 62   Poor OH endurance    90/90 ER/IR 4/5 P!   Poor t/s mobility        Access Code: [email or web address]  URL: [email or web address]  Date: 2025  Prepared by: Milton Escobedo    Exercises  - Open Book with Leg on Pillow  - 1 x daily - 7 x weekly - 1 sets - 10 reps - 5-10 seconds hold  - Sleeper Stretch  - 1 x daily - 7 x weekly - 1 sets - 10 reps - 10 seconds hold  - Face Pulls  - 1 x daily - 3 x weekly - 3 sets - 10 reps  - Standing Shoulder Single Arm PNF D2 Flexion with Resistance  - 1 x daily - 3 x weekly - 3 sets - 10 reps  - Shoulder extension with resistance - Neutral  - 1 x daily - 3 x weekly - 3 sets - 10 reps      Manuals                                                  "               Neuro Re-Ed             HEP review/pt education 25'            Serratus wall slide  2x10 3\"           OH DB carries             RTC wall clock  2x5 otb           Push up plus             Face pull  2x10 8#           Shoulder press iso ER  2x10 B 2.5#                                     Ther Ex             UBE  3/3 L0           Sleeper stretch 10x10\"            Open book 5x B 5\" 15x B 5\"           Row to ER 10x gtb            D2 flexion 10x gtb            LPD Reviewed HEP            Jones row  2x10 B 15#                                                  Ther Activity                                       Gait Training                                       Modalities                                                    [1]   Past Medical History:  Diagnosis Date    Asthma    [2]   Allergies  Allergen Reactions    Penicillins      Unknown, childhood allergy     "

## 2025-07-22 ENCOUNTER — APPOINTMENT (OUTPATIENT)
Dept: PHYSICAL THERAPY | Facility: REHABILITATION | Age: 45
End: 2025-07-22
Payer: COMMERCIAL

## 2025-07-29 ENCOUNTER — OFFICE VISIT (OUTPATIENT)
Dept: PHYSICAL THERAPY | Facility: REHABILITATION | Age: 45
End: 2025-07-29
Payer: COMMERCIAL

## 2025-07-29 DIAGNOSIS — S49.90XA INJURY OF GLENOID LABRUM, INITIAL ENCOUNTER: ICD-10-CM

## 2025-07-29 DIAGNOSIS — M75.42 IMPINGEMENT SYNDROME OF LEFT SHOULDER: ICD-10-CM

## 2025-07-29 DIAGNOSIS — M75.22 BICIPITAL TENDINITIS OF LEFT SHOULDER: Primary | ICD-10-CM

## 2025-07-29 PROCEDURE — 97112 NEUROMUSCULAR REEDUCATION: CPT

## 2025-07-29 PROCEDURE — 97110 THERAPEUTIC EXERCISES: CPT

## 2025-08-05 ENCOUNTER — OFFICE VISIT (OUTPATIENT)
Dept: PHYSICAL THERAPY | Facility: REHABILITATION | Age: 45
End: 2025-08-05
Payer: COMMERCIAL

## 2025-08-05 DIAGNOSIS — S49.90XA INJURY OF GLENOID LABRUM, INITIAL ENCOUNTER: ICD-10-CM

## 2025-08-05 DIAGNOSIS — M75.22 BICIPITAL TENDINITIS OF LEFT SHOULDER: Primary | ICD-10-CM

## 2025-08-05 DIAGNOSIS — M75.42 IMPINGEMENT SYNDROME OF LEFT SHOULDER: ICD-10-CM

## 2025-08-05 PROCEDURE — 97110 THERAPEUTIC EXERCISES: CPT

## 2025-08-05 PROCEDURE — 97112 NEUROMUSCULAR REEDUCATION: CPT

## 2025-08-12 ENCOUNTER — OFFICE VISIT (OUTPATIENT)
Dept: PHYSICAL THERAPY | Facility: REHABILITATION | Age: 45
End: 2025-08-12
Payer: COMMERCIAL

## (undated) DEVICE — ACE WRAP 4 IN UNSTERILE

## (undated) DEVICE — STERILE BETHLEHEM PLASTIC HAND: Brand: CARDINAL HEALTH

## (undated) DEVICE — GLOVE SRG BIOGEL 7.5

## (undated) DEVICE — ADHESIVE SKN CLSR HISTOACRYL FLEX 0.5ML LF

## (undated) DEVICE — OCCLUSIVE GAUZE STRIP,3% BISMUTH TRIBROMOPHENATE IN PETROLATUM BLEND: Brand: XEROFORM

## (undated) DEVICE — STRL COTTON TIP APPLCTR 6IN PK: Brand: CARDINAL HEALTH

## (undated) DEVICE — GLOVE INDICATOR PI UNDERGLOVE SZ 8 BLUE

## (undated) DEVICE — GAUZE SPONGES,16 PLY: Brand: CURITY

## (undated) DEVICE — CUFF TOURNIQUET 18 X 4 IN QUICK CONNECT DISP 1 BLADDER

## (undated) DEVICE — RETROGRADE KNIFE BOX OF 6: Brand: ECTRA

## (undated) DEVICE — DISPOSABLE EQUIPMENT COVER: Brand: SMALL TOWEL DRAPE

## (undated) DEVICE — NEEDLE 25G X 1 1/2

## (undated) DEVICE — PADDING CAST 4 IN  COTTON STRL

## (undated) DEVICE — SPONGE PVP SCRUB WING STERILE